# Patient Record
Sex: FEMALE | HISPANIC OR LATINO | URBAN - METROPOLITAN AREA
[De-identification: names, ages, dates, MRNs, and addresses within clinical notes are randomized per-mention and may not be internally consistent; named-entity substitution may affect disease eponyms.]

---

## 2021-01-01 ENCOUNTER — INPATIENT (INPATIENT)
Facility: HOSPITAL | Age: 66
LOS: 0 days | DRG: 435 | End: 2021-11-19
Attending: INTERNAL MEDICINE | Admitting: INTERNAL MEDICINE
Payer: MEDICARE

## 2021-01-01 VITALS
OXYGEN SATURATION: 96 % | HEIGHT: 62 IN | RESPIRATION RATE: 20 BRPM | SYSTOLIC BLOOD PRESSURE: 108 MMHG | WEIGHT: 149.91 LBS | TEMPERATURE: 99 F | DIASTOLIC BLOOD PRESSURE: 70 MMHG | HEART RATE: 87 BPM

## 2021-01-01 VITALS — OXYGEN SATURATION: 91 %

## 2021-01-01 DIAGNOSIS — K86.89 OTHER SPECIFIED DISEASES OF PANCREAS: ICD-10-CM

## 2021-01-01 DIAGNOSIS — I21.4 NON-ST ELEVATION (NSTEMI) MYOCARDIAL INFARCTION: ICD-10-CM

## 2021-01-01 DIAGNOSIS — E11.9 TYPE 2 DIABETES MELLITUS WITHOUT COMPLICATIONS: ICD-10-CM

## 2021-01-01 DIAGNOSIS — R10.9 UNSPECIFIED ABDOMINAL PAIN: ICD-10-CM

## 2021-01-01 DIAGNOSIS — N17.9 ACUTE KIDNEY FAILURE, UNSPECIFIED: ICD-10-CM

## 2021-01-01 DIAGNOSIS — I21.A1 MYOCARDIAL INFARCTION TYPE 2: ICD-10-CM

## 2021-01-01 DIAGNOSIS — R11.10 VOMITING, UNSPECIFIED: ICD-10-CM

## 2021-01-01 DIAGNOSIS — Z86.018 PERSONAL HISTORY OF OTHER BENIGN NEOPLASM: Chronic | ICD-10-CM

## 2021-01-01 DIAGNOSIS — I10 ESSENTIAL (PRIMARY) HYPERTENSION: ICD-10-CM

## 2021-01-01 DIAGNOSIS — K81.9 CHOLECYSTITIS, UNSPECIFIED: ICD-10-CM

## 2021-01-01 DIAGNOSIS — Z29.9 ENCOUNTER FOR PROPHYLACTIC MEASURES, UNSPECIFIED: ICD-10-CM

## 2021-01-01 DIAGNOSIS — R74.01 ELEVATION OF LEVELS OF LIVER TRANSAMINASE LEVELS: ICD-10-CM

## 2021-01-01 LAB
A1C WITH ESTIMATED AVERAGE GLUCOSE RESULT: 9.3 % — HIGH (ref 4–5.6)
AFP-TM SERPL-MCNC: <1.8 NG/ML — SIGNIFICANT CHANGE UP
ALBUMIN SERPL ELPH-MCNC: 1.8 G/DL — LOW (ref 3.5–5)
ALBUMIN SERPL ELPH-MCNC: 2.3 G/DL — LOW (ref 3.5–5)
ALBUMIN SERPL ELPH-MCNC: 2.6 G/DL — LOW (ref 3.5–5)
ALBUMIN SERPL ELPH-MCNC: 3.1 G/DL — LOW (ref 3.5–5)
ALP SERPL-CCNC: 68 U/L — SIGNIFICANT CHANGE UP (ref 40–120)
ALP SERPL-CCNC: 79 U/L — SIGNIFICANT CHANGE UP (ref 40–120)
ALP SERPL-CCNC: 93 U/L — SIGNIFICANT CHANGE UP (ref 40–120)
ALP SERPL-CCNC: 99 U/L — SIGNIFICANT CHANGE UP (ref 40–120)
ALT FLD-CCNC: 2527 U/L DA — HIGH (ref 10–60)
ALT FLD-CCNC: 2968 U/L DA — HIGH (ref 10–60)
ALT FLD-CCNC: 3409 U/L DA — HIGH (ref 10–60)
ALT FLD-CCNC: 98 U/L DA — HIGH (ref 10–60)
ANION GAP SERPL CALC-SCNC: 12 MMOL/L — SIGNIFICANT CHANGE UP (ref 5–17)
ANION GAP SERPL CALC-SCNC: 18 MMOL/L — HIGH (ref 5–17)
ANION GAP SERPL CALC-SCNC: 18 MMOL/L — HIGH (ref 5–17)
ANION GAP SERPL CALC-SCNC: 19 MMOL/L — HIGH (ref 5–17)
ANISOCYTOSIS BLD QL: SLIGHT — SIGNIFICANT CHANGE UP
APPEARANCE UR: ABNORMAL
APTT BLD: 36.2 SEC — HIGH (ref 27.5–35.5)
AST SERPL-CCNC: 163 U/L — HIGH (ref 10–40)
AST SERPL-CCNC: 4685 U/L — HIGH (ref 10–40)
AST SERPL-CCNC: 6158 U/L — HIGH (ref 10–40)
AST SERPL-CCNC: 8606 U/L — HIGH (ref 10–40)
BASE EXCESS BLDV CALC-SCNC: -11.2 MMOL/L — SIGNIFICANT CHANGE UP
BASE EXCESS BLDV CALC-SCNC: -17.1 MMOL/L — SIGNIFICANT CHANGE UP
BASOPHILS # BLD AUTO: 0.04 K/UL — SIGNIFICANT CHANGE UP (ref 0–0.2)
BASOPHILS # BLD AUTO: 0.05 K/UL — SIGNIFICANT CHANGE UP (ref 0–0.2)
BASOPHILS # BLD AUTO: 0.05 K/UL — SIGNIFICANT CHANGE UP (ref 0–0.2)
BASOPHILS NFR BLD AUTO: 0.3 % — SIGNIFICANT CHANGE UP (ref 0–2)
BASOPHILS NFR BLD AUTO: 0.3 % — SIGNIFICANT CHANGE UP (ref 0–2)
BASOPHILS NFR BLD AUTO: 0.6 % — SIGNIFICANT CHANGE UP (ref 0–2)
BILIRUB DIRECT SERPL-MCNC: 0.5 MG/DL — HIGH (ref 0–0.2)
BILIRUB INDIRECT FLD-MCNC: 0.6 MG/DL — SIGNIFICANT CHANGE UP (ref 0.2–1)
BILIRUB SERPL-MCNC: 0.9 MG/DL — SIGNIFICANT CHANGE UP (ref 0.2–1.2)
BILIRUB SERPL-MCNC: 1 MG/DL — SIGNIFICANT CHANGE UP (ref 0.2–1.2)
BILIRUB SERPL-MCNC: 1.1 MG/DL — SIGNIFICANT CHANGE UP (ref 0.2–1.2)
BILIRUB SERPL-MCNC: 1.2 MG/DL — SIGNIFICANT CHANGE UP (ref 0.2–1.2)
BILIRUB UR-MCNC: ABNORMAL
BLOOD GAS COMMENTS, VENOUS: SIGNIFICANT CHANGE UP
BUN SERPL-MCNC: 14 MG/DL — SIGNIFICANT CHANGE UP (ref 7–18)
BUN SERPL-MCNC: 21 MG/DL — HIGH (ref 7–18)
BUN SERPL-MCNC: 23 MG/DL — HIGH (ref 7–18)
BUN SERPL-MCNC: 24 MG/DL — HIGH (ref 7–18)
BURR CELLS BLD QL SMEAR: PRESENT — SIGNIFICANT CHANGE UP
CALCIUM SERPL-MCNC: 7.9 MG/DL — LOW (ref 8.4–10.5)
CALCIUM SERPL-MCNC: 8.3 MG/DL — LOW (ref 8.4–10.5)
CALCIUM SERPL-MCNC: 8.9 MG/DL — SIGNIFICANT CHANGE UP (ref 8.4–10.5)
CALCIUM SERPL-MCNC: 9.1 MG/DL — SIGNIFICANT CHANGE UP (ref 8.4–10.5)
CANCER AG125 SERPL-ACNC: 382 U/ML — HIGH
CANCER AG19-9 SERPL-ACNC: 1427 U/ML — HIGH
CHLORIDE SERPL-SCNC: 103 MMOL/L — SIGNIFICANT CHANGE UP (ref 96–108)
CHLORIDE SERPL-SCNC: 106 MMOL/L — SIGNIFICANT CHANGE UP (ref 96–108)
CHLORIDE SERPL-SCNC: 107 MMOL/L — SIGNIFICANT CHANGE UP (ref 96–108)
CHLORIDE SERPL-SCNC: 110 MMOL/L — HIGH (ref 96–108)
CHOLEST SERPL-MCNC: 95 MG/DL — SIGNIFICANT CHANGE UP
CO2 SERPL-SCNC: 15 MMOL/L — LOW (ref 22–31)
CO2 SERPL-SCNC: 15 MMOL/L — LOW (ref 22–31)
CO2 SERPL-SCNC: 17 MMOL/L — LOW (ref 22–31)
CO2 SERPL-SCNC: 22 MMOL/L — SIGNIFICANT CHANGE UP (ref 22–31)
COLOR SPEC: YELLOW — SIGNIFICANT CHANGE UP
COVID-19 NUCLEOCAPSID GAM AB INTERP: NEGATIVE — SIGNIFICANT CHANGE UP
COVID-19 NUCLEOCAPSID TOTAL GAM ANTIBODY RESULT: 0.08 INDEX — SIGNIFICANT CHANGE UP
COVID-19 SPIKE DOMAIN AB INTERP: POSITIVE
COVID-19 SPIKE DOMAIN ANTIBODY RESULT: >250 U/ML — HIGH
CREAT SERPL-MCNC: 1.64 MG/DL — HIGH (ref 0.5–1.3)
CREAT SERPL-MCNC: 2.54 MG/DL — HIGH (ref 0.5–1.3)
CREAT SERPL-MCNC: 2.81 MG/DL — HIGH (ref 0.5–1.3)
CREAT SERPL-MCNC: 2.99 MG/DL — HIGH (ref 0.5–1.3)
D DIMER BLD IA.RAPID-MCNC: HIGH NG/ML DDU
DIFF PNL FLD: ABNORMAL
EOSINOPHIL # BLD AUTO: 0.01 K/UL — SIGNIFICANT CHANGE UP (ref 0–0.5)
EOSINOPHIL # BLD AUTO: 0.03 K/UL — SIGNIFICANT CHANGE UP (ref 0–0.5)
EOSINOPHIL # BLD AUTO: 0.03 K/UL — SIGNIFICANT CHANGE UP (ref 0–0.5)
EOSINOPHIL NFR BLD AUTO: 0.1 % — SIGNIFICANT CHANGE UP (ref 0–6)
EOSINOPHIL NFR BLD AUTO: 0.2 % — SIGNIFICANT CHANGE UP (ref 0–6)
EOSINOPHIL NFR BLD AUTO: 0.3 % — SIGNIFICANT CHANGE UP (ref 0–6)
ESTIMATED AVERAGE GLUCOSE: 220 MG/DL — HIGH (ref 68–114)
GIANT PLATELETS BLD QL SMEAR: PRESENT — SIGNIFICANT CHANGE UP
GLUCOSE BLDC GLUCOMTR-MCNC: 183 MG/DL — HIGH (ref 70–99)
GLUCOSE BLDC GLUCOMTR-MCNC: 212 MG/DL — HIGH (ref 70–99)
GLUCOSE BLDC GLUCOMTR-MCNC: 216 MG/DL — HIGH (ref 70–99)
GLUCOSE BLDC GLUCOMTR-MCNC: 284 MG/DL — HIGH (ref 70–99)
GLUCOSE BLDC GLUCOMTR-MCNC: 297 MG/DL — HIGH (ref 70–99)
GLUCOSE SERPL-MCNC: 187 MG/DL — HIGH (ref 70–99)
GLUCOSE SERPL-MCNC: 240 MG/DL — HIGH (ref 70–99)
GLUCOSE SERPL-MCNC: 301 MG/DL — HIGH (ref 70–99)
GLUCOSE SERPL-MCNC: 309 MG/DL — HIGH (ref 70–99)
GLUCOSE UR QL: 50 MG/DL
HCO3 BLDV-SCNC: 12 MMOL/L — LOW (ref 22–29)
HCO3 BLDV-SCNC: 16 MMOL/L — LOW (ref 22–29)
HCT VFR BLD CALC: 33 % — LOW (ref 34.5–45)
HCT VFR BLD CALC: 39.3 % — SIGNIFICANT CHANGE UP (ref 34.5–45)
HCT VFR BLD CALC: 39.9 % — SIGNIFICANT CHANGE UP (ref 34.5–45)
HCT VFR BLD CALC: 40.1 % — SIGNIFICANT CHANGE UP (ref 34.5–45)
HCV AB S/CO SERPL IA: 0.15 S/CO — SIGNIFICANT CHANGE UP (ref 0–0.99)
HCV AB SERPL-IMP: SIGNIFICANT CHANGE UP
HDLC SERPL-MCNC: 38 MG/DL — LOW
HGB BLD-MCNC: 10.2 G/DL — LOW (ref 11.5–15.5)
HGB BLD-MCNC: 12.3 G/DL — SIGNIFICANT CHANGE UP (ref 11.5–15.5)
HGB BLD-MCNC: 12.7 G/DL — SIGNIFICANT CHANGE UP (ref 11.5–15.5)
HGB BLD-MCNC: 12.8 G/DL — SIGNIFICANT CHANGE UP (ref 11.5–15.5)
HOROWITZ INDEX BLDV+IHG-RTO: 21 — SIGNIFICANT CHANGE UP
IMM GRANULOCYTES NFR BLD AUTO: 0.8 % — SIGNIFICANT CHANGE UP (ref 0–1.5)
IMM GRANULOCYTES NFR BLD AUTO: 1.5 % — SIGNIFICANT CHANGE UP (ref 0–1.5)
IMM GRANULOCYTES NFR BLD AUTO: 1.6 % — HIGH (ref 0–1.5)
INR BLD: 3.13 RATIO — HIGH (ref 0.88–1.16)
KETONES UR-MCNC: ABNORMAL
LACTATE SERPL-SCNC: 11.1 MMOL/L — CRITICAL HIGH (ref 0.7–2)
LACTATE SERPL-SCNC: 11.8 MMOL/L — CRITICAL HIGH (ref 0.7–2)
LACTATE SERPL-SCNC: 9.1 MMOL/L — CRITICAL HIGH (ref 0.7–2)
LEUKOCYTE ESTERASE UR-ACNC: ABNORMAL
LG PLATELETS BLD QL AUTO: SLIGHT — SIGNIFICANT CHANGE UP
LIDOCAIN IGE QN: 38 U/L — LOW (ref 73–393)
LIPID PNL WITH DIRECT LDL SERPL: 31 MG/DL — SIGNIFICANT CHANGE UP
LYMPHOCYTES # BLD AUTO: 0.85 K/UL — LOW (ref 1–3.3)
LYMPHOCYTES # BLD AUTO: 1.46 K/UL — SIGNIFICANT CHANGE UP (ref 1–3.3)
LYMPHOCYTES # BLD AUTO: 10.3 % — LOW (ref 13–44)
LYMPHOCYTES # BLD AUTO: 11 % — LOW (ref 13–44)
LYMPHOCYTES # BLD AUTO: 15.5 % — SIGNIFICANT CHANGE UP (ref 13–44)
LYMPHOCYTES # BLD AUTO: 2.92 K/UL — SIGNIFICANT CHANGE UP (ref 1–3.3)
LYMPHOCYTES # BLD AUTO: 9.8 % — LOW (ref 13–44)
MAGNESIUM SERPL-MCNC: 1.6 MG/DL — SIGNIFICANT CHANGE UP (ref 1.6–2.6)
MAGNESIUM SERPL-MCNC: 1.9 MG/DL — SIGNIFICANT CHANGE UP (ref 1.6–2.6)
MAGNESIUM SERPL-MCNC: 2.1 MG/DL — SIGNIFICANT CHANGE UP (ref 1.6–2.6)
MCHC RBC-ENTMCNC: 27.8 PG — SIGNIFICANT CHANGE UP (ref 27–34)
MCHC RBC-ENTMCNC: 28.2 PG — SIGNIFICANT CHANGE UP (ref 27–34)
MCHC RBC-ENTMCNC: 28.4 PG — SIGNIFICANT CHANGE UP (ref 27–34)
MCHC RBC-ENTMCNC: 29.1 PG — SIGNIFICANT CHANGE UP (ref 27–34)
MCHC RBC-ENTMCNC: 30.9 GM/DL — LOW (ref 32–36)
MCHC RBC-ENTMCNC: 31.3 GM/DL — LOW (ref 32–36)
MCHC RBC-ENTMCNC: 31.8 GM/DL — LOW (ref 32–36)
MCHC RBC-ENTMCNC: 31.9 GM/DL — LOW (ref 32–36)
MCV RBC AUTO: 87.2 FL — SIGNIFICANT CHANGE UP (ref 80–100)
MCV RBC AUTO: 90.8 FL — SIGNIFICANT CHANGE UP (ref 80–100)
MCV RBC AUTO: 91.2 FL — SIGNIFICANT CHANGE UP (ref 80–100)
MCV RBC AUTO: 91.3 FL — SIGNIFICANT CHANGE UP (ref 80–100)
MICROCYTES BLD QL: SLIGHT — SIGNIFICANT CHANGE UP
MONOCYTES # BLD AUTO: 0.56 K/UL — SIGNIFICANT CHANGE UP (ref 0–0.9)
MONOCYTES # BLD AUTO: 1.63 K/UL — HIGH (ref 0–0.9)
MONOCYTES # BLD AUTO: 1.7 K/UL — HIGH (ref 0–0.9)
MONOCYTES NFR BLD AUTO: 11.5 % — SIGNIFICANT CHANGE UP (ref 2–14)
MONOCYTES NFR BLD AUTO: 6 % — SIGNIFICANT CHANGE UP (ref 2–14)
MONOCYTES NFR BLD AUTO: 6.4 % — SIGNIFICANT CHANGE UP (ref 2–14)
MONOCYTES NFR BLD AUTO: 9 % — SIGNIFICANT CHANGE UP (ref 2–14)
NEUTROPHILS # BLD AUTO: 10.84 K/UL — HIGH (ref 1.8–7.4)
NEUTROPHILS # BLD AUTO: 13.79 K/UL — HIGH (ref 1.8–7.4)
NEUTROPHILS # BLD AUTO: 7.15 K/UL — SIGNIFICANT CHANGE UP (ref 1.8–7.4)
NEUTROPHILS NFR BLD AUTO: 73.4 % — SIGNIFICANT CHANGE UP (ref 43–77)
NEUTROPHILS NFR BLD AUTO: 76.3 % — SIGNIFICANT CHANGE UP (ref 43–77)
NEUTROPHILS NFR BLD AUTO: 80 % — HIGH (ref 43–77)
NEUTROPHILS NFR BLD AUTO: 82.1 % — HIGH (ref 43–77)
NEUTS BAND # BLD: 3 % — SIGNIFICANT CHANGE UP (ref 0–8)
NITRITE UR-MCNC: POSITIVE
NON HDL CHOLESTEROL: 57 MG/DL — SIGNIFICANT CHANGE UP
NRBC # BLD: 0 /100 WBCS — SIGNIFICANT CHANGE UP (ref 0–0)
OSMOLALITY UR: 324 MOS/KG — SIGNIFICANT CHANGE UP (ref 50–1200)
OVALOCYTES BLD QL SMEAR: SLIGHT — SIGNIFICANT CHANGE UP
PCO2 BLDV: 40 MMHG — SIGNIFICANT CHANGE UP (ref 39–42)
PCO2 BLDV: 42 MMHG — SIGNIFICANT CHANGE UP (ref 39–42)
PH BLDV: 7.09 — LOW (ref 7.32–7.43)
PH BLDV: 7.2 — LOW (ref 7.32–7.43)
PH UR: 5 — SIGNIFICANT CHANGE UP (ref 5–8)
PHOSPHATE SERPL-MCNC: 6.8 MG/DL — HIGH (ref 2.5–4.5)
PHOSPHATE SERPL-MCNC: 7 MG/DL — HIGH (ref 2.5–4.5)
PHOSPHATE SERPL-MCNC: 8.3 MG/DL — HIGH (ref 2.5–4.5)
PLAT MORPH BLD: NORMAL — SIGNIFICANT CHANGE UP
PLATELET # BLD AUTO: 108 K/UL — LOW (ref 150–400)
PLATELET # BLD AUTO: 113 K/UL — LOW (ref 150–400)
PLATELET # BLD AUTO: 134 K/UL — LOW (ref 150–400)
PLATELET # BLD AUTO: 191 K/UL — SIGNIFICANT CHANGE UP (ref 150–400)
PO2 BLDV: 25 MMHG — SIGNIFICANT CHANGE UP
PO2 BLDV: 48 MMHG — SIGNIFICANT CHANGE UP
POLYCHROMASIA BLD QL SMEAR: SLIGHT — SIGNIFICANT CHANGE UP
POTASSIUM SERPL-MCNC: 4.8 MMOL/L — SIGNIFICANT CHANGE UP (ref 3.5–5.3)
POTASSIUM SERPL-MCNC: 5.1 MMOL/L — SIGNIFICANT CHANGE UP (ref 3.5–5.3)
POTASSIUM SERPL-MCNC: 5.9 MMOL/L — HIGH (ref 3.5–5.3)
POTASSIUM SERPL-MCNC: 6 MMOL/L — HIGH (ref 3.5–5.3)
POTASSIUM SERPL-SCNC: 4.8 MMOL/L — SIGNIFICANT CHANGE UP (ref 3.5–5.3)
POTASSIUM SERPL-SCNC: 5.1 MMOL/L — SIGNIFICANT CHANGE UP (ref 3.5–5.3)
POTASSIUM SERPL-SCNC: 5.9 MMOL/L — HIGH (ref 3.5–5.3)
POTASSIUM SERPL-SCNC: 6 MMOL/L — HIGH (ref 3.5–5.3)
PROT SERPL-MCNC: 4.9 G/DL — LOW (ref 6–8.3)
PROT SERPL-MCNC: 5.2 G/DL — LOW (ref 6–8.3)
PROT SERPL-MCNC: 6.2 G/DL — SIGNIFICANT CHANGE UP (ref 6–8.3)
PROT SERPL-MCNC: 7.4 G/DL — SIGNIFICANT CHANGE UP (ref 6–8.3)
PROT UR-MCNC: 100
PROTHROM AB SERPL-ACNC: 35.3 SEC — HIGH (ref 10.6–13.6)
RBC # BLD: 3.62 M/UL — LOW (ref 3.8–5.2)
RBC # BLD: 4.33 M/UL — SIGNIFICANT CHANGE UP (ref 3.8–5.2)
RBC # BLD: 4.37 M/UL — SIGNIFICANT CHANGE UP (ref 3.8–5.2)
RBC # BLD: 4.6 M/UL — SIGNIFICANT CHANGE UP (ref 3.8–5.2)
RBC # FLD: 14.3 % — SIGNIFICANT CHANGE UP (ref 10.3–14.5)
RBC # FLD: 14.6 % — HIGH (ref 10.3–14.5)
RBC # FLD: 14.7 % — HIGH (ref 10.3–14.5)
RBC # FLD: 14.7 % — HIGH (ref 10.3–14.5)
RBC BLD AUTO: ABNORMAL
SAO2 % BLDV: 31.4 % — SIGNIFICANT CHANGE UP
SAO2 % BLDV: 64.7 % — SIGNIFICANT CHANGE UP
SARS-COV-2 IGG+IGM SERPL QL IA: 0.08 INDEX — SIGNIFICANT CHANGE UP
SARS-COV-2 IGG+IGM SERPL QL IA: >250 U/ML — HIGH
SARS-COV-2 IGG+IGM SERPL QL IA: NEGATIVE — SIGNIFICANT CHANGE UP
SARS-COV-2 IGG+IGM SERPL QL IA: POSITIVE
SARS-COV-2 RNA SPEC QL NAA+PROBE: SIGNIFICANT CHANGE UP
SCHISTOCYTES BLD QL AUTO: SLIGHT — SIGNIFICANT CHANGE UP
SODIUM SERPL-SCNC: 137 MMOL/L — SIGNIFICANT CHANGE UP (ref 135–145)
SODIUM SERPL-SCNC: 140 MMOL/L — SIGNIFICANT CHANGE UP (ref 135–145)
SODIUM SERPL-SCNC: 142 MMOL/L — SIGNIFICANT CHANGE UP (ref 135–145)
SODIUM SERPL-SCNC: 143 MMOL/L — SIGNIFICANT CHANGE UP (ref 135–145)
SODIUM UR-SCNC: 7 MMOL/L — SIGNIFICANT CHANGE UP
SP GR SPEC: 1.02 — SIGNIFICANT CHANGE UP (ref 1.01–1.02)
SPHEROCYTES BLD QL SMEAR: SLIGHT — SIGNIFICANT CHANGE UP
TRIGL SERPL-MCNC: 129 MG/DL — SIGNIFICANT CHANGE UP
TROPONIN I, HIGH SENSITIVITY RESULT: 1974.3 NG/L — HIGH
TROPONIN I, HIGH SENSITIVITY RESULT: 2153.1 NG/L — HIGH
TSH SERPL-MCNC: 0.9 UU/ML — SIGNIFICANT CHANGE UP (ref 0.34–4.82)
UROBILINOGEN FLD QL: 1
WBC # BLD: 14.19 K/UL — HIGH (ref 3.8–10.5)
WBC # BLD: 14.69 K/UL — HIGH (ref 3.8–10.5)
WBC # BLD: 18.79 K/UL — HIGH (ref 3.8–10.5)
WBC # BLD: 8.71 K/UL — SIGNIFICANT CHANGE UP (ref 3.8–10.5)
WBC # FLD AUTO: 14.19 K/UL — HIGH (ref 3.8–10.5)
WBC # FLD AUTO: 14.69 K/UL — HIGH (ref 3.8–10.5)
WBC # FLD AUTO: 18.79 K/UL — HIGH (ref 3.8–10.5)
WBC # FLD AUTO: 8.71 K/UL — SIGNIFICANT CHANGE UP (ref 3.8–10.5)

## 2021-01-01 PROCEDURE — 71045 X-RAY EXAM CHEST 1 VIEW: CPT | Mod: 26

## 2021-01-01 PROCEDURE — 99221 1ST HOSP IP/OBS SF/LOW 40: CPT

## 2021-01-01 PROCEDURE — 76705 ECHO EXAM OF ABDOMEN: CPT | Mod: 26,RT

## 2021-01-01 PROCEDURE — 71250 CT THORAX DX C-: CPT | Mod: 26

## 2021-01-01 PROCEDURE — 93010 ELECTROCARDIOGRAM REPORT: CPT

## 2021-01-01 PROCEDURE — G1004: CPT

## 2021-01-01 PROCEDURE — 99285 EMERGENCY DEPT VISIT HI MDM: CPT

## 2021-01-01 PROCEDURE — 74176 CT ABD & PELVIS W/O CONTRAST: CPT | Mod: 26,MG

## 2021-01-01 RX ORDER — MIDAZOLAM HYDROCHLORIDE 1 MG/ML
2 INJECTION, SOLUTION INTRAMUSCULAR; INTRAVENOUS ONCE
Refills: 0 | Status: DISCONTINUED | OUTPATIENT
Start: 2021-01-01 | End: 2021-01-01

## 2021-01-01 RX ORDER — INSULIN HUMAN 100 [IU]/ML
5 INJECTION, SOLUTION SUBCUTANEOUS ONCE
Refills: 0 | Status: DISCONTINUED | OUTPATIENT
Start: 2021-01-01 | End: 2021-01-01

## 2021-01-01 RX ORDER — SODIUM POLYSTYRENE SULFONATE 4.1 MEQ/G
30 POWDER, FOR SUSPENSION ORAL ONCE
Refills: 0 | Status: COMPLETED | OUTPATIENT
Start: 2021-01-01 | End: 2021-01-01

## 2021-01-01 RX ORDER — APREPITANT 80 MG/1
40 CAPSULE ORAL ONCE
Refills: 0 | Status: DISCONTINUED | OUTPATIENT
Start: 2021-01-01 | End: 2021-01-01

## 2021-01-01 RX ORDER — MIDODRINE HYDROCHLORIDE 2.5 MG/1
5 TABLET ORAL ONCE
Refills: 0 | Status: COMPLETED | OUTPATIENT
Start: 2021-01-01 | End: 2021-01-01

## 2021-01-01 RX ORDER — PIPERACILLIN AND TAZOBACTAM 4; .5 G/20ML; G/20ML
3.38 INJECTION, POWDER, LYOPHILIZED, FOR SOLUTION INTRAVENOUS ONCE
Refills: 0 | Status: DISCONTINUED | OUTPATIENT
Start: 2021-01-01 | End: 2021-01-01

## 2021-01-01 RX ORDER — SODIUM CHLORIDE 9 MG/ML
500 INJECTION INTRAMUSCULAR; INTRAVENOUS; SUBCUTANEOUS ONCE
Refills: 0 | Status: COMPLETED | OUTPATIENT
Start: 2021-01-01 | End: 2021-01-01

## 2021-01-01 RX ORDER — MORPHINE SULFATE 50 MG/1
4 CAPSULE, EXTENDED RELEASE ORAL ONCE
Refills: 0 | Status: DISCONTINUED | OUTPATIENT
Start: 2021-01-01 | End: 2021-01-01

## 2021-01-01 RX ORDER — HEPARIN SODIUM 5000 [USP'U]/ML
5500 INJECTION INTRAVENOUS; SUBCUTANEOUS ONCE
Refills: 0 | Status: DISCONTINUED | OUTPATIENT
Start: 2021-01-01 | End: 2021-01-01

## 2021-01-01 RX ORDER — FAMOTIDINE 10 MG/ML
20 INJECTION INTRAVENOUS ONCE
Refills: 0 | Status: COMPLETED | OUTPATIENT
Start: 2021-01-01 | End: 2021-01-01

## 2021-01-01 RX ORDER — CHLORHEXIDINE GLUCONATE 213 G/1000ML
1 SOLUTION TOPICAL
Refills: 0 | Status: DISCONTINUED | OUTPATIENT
Start: 2021-01-01 | End: 2021-01-01

## 2021-01-01 RX ORDER — HEPARIN SODIUM 5000 [USP'U]/ML
INJECTION INTRAVENOUS; SUBCUTANEOUS
Qty: 25000 | Refills: 0 | Status: DISCONTINUED | OUTPATIENT
Start: 2021-01-01 | End: 2021-01-01

## 2021-01-01 RX ORDER — ACETAMINOPHEN 500 MG
1000 TABLET ORAL ONCE
Refills: 0 | Status: COMPLETED | OUTPATIENT
Start: 2021-01-01 | End: 2021-01-01

## 2021-01-01 RX ORDER — SODIUM CHLORIDE 9 MG/ML
1000 INJECTION, SOLUTION INTRAVENOUS ONCE
Refills: 0 | Status: COMPLETED | OUTPATIENT
Start: 2021-01-01 | End: 2021-01-01

## 2021-01-01 RX ORDER — HEPARIN SODIUM 5000 [USP'U]/ML
2500 INJECTION INTRAVENOUS; SUBCUTANEOUS EVERY 6 HOURS
Refills: 0 | Status: DISCONTINUED | OUTPATIENT
Start: 2021-01-01 | End: 2021-01-01

## 2021-01-01 RX ORDER — SODIUM CHLORIDE 9 MG/ML
1000 INJECTION INTRAMUSCULAR; INTRAVENOUS; SUBCUTANEOUS ONCE
Refills: 0 | Status: COMPLETED | OUTPATIENT
Start: 2021-01-01 | End: 2021-01-01

## 2021-01-01 RX ORDER — HYDROMORPHONE HYDROCHLORIDE 2 MG/ML
1 INJECTION INTRAMUSCULAR; INTRAVENOUS; SUBCUTANEOUS EVERY 4 HOURS
Refills: 0 | Status: DISCONTINUED | OUTPATIENT
Start: 2021-01-01 | End: 2021-01-01

## 2021-01-01 RX ORDER — TRAMADOL HYDROCHLORIDE 50 MG/1
25 TABLET ORAL EVERY 6 HOURS
Refills: 0 | Status: DISCONTINUED | OUTPATIENT
Start: 2021-01-01 | End: 2021-01-01

## 2021-01-01 RX ORDER — PIPERACILLIN AND TAZOBACTAM 4; .5 G/20ML; G/20ML
3.38 INJECTION, POWDER, LYOPHILIZED, FOR SOLUTION INTRAVENOUS EVERY 8 HOURS
Refills: 0 | Status: DISCONTINUED | OUTPATIENT
Start: 2021-01-01 | End: 2021-01-01

## 2021-01-01 RX ORDER — ONDANSETRON 8 MG/1
4 TABLET, FILM COATED ORAL ONCE
Refills: 0 | Status: COMPLETED | OUTPATIENT
Start: 2021-01-01 | End: 2021-01-01

## 2021-01-01 RX ORDER — ALBUMIN HUMAN 25 %
50 VIAL (ML) INTRAVENOUS EVERY 6 HOURS
Refills: 0 | Status: DISCONTINUED | OUTPATIENT
Start: 2021-01-01 | End: 2021-01-01

## 2021-01-01 RX ORDER — INSULIN HUMAN 100 [IU]/ML
5 INJECTION, SOLUTION SUBCUTANEOUS ONCE
Refills: 0 | Status: COMPLETED | OUTPATIENT
Start: 2021-01-01 | End: 2021-01-01

## 2021-01-01 RX ORDER — HEPARIN SODIUM 5000 [USP'U]/ML
5500 INJECTION INTRAVENOUS; SUBCUTANEOUS EVERY 6 HOURS
Refills: 0 | Status: DISCONTINUED | OUTPATIENT
Start: 2021-01-01 | End: 2021-01-01

## 2021-01-01 RX ORDER — HEPARIN SODIUM 5000 [USP'U]/ML
5000 INJECTION INTRAVENOUS; SUBCUTANEOUS EVERY 8 HOURS
Refills: 0 | Status: DISCONTINUED | OUTPATIENT
Start: 2021-01-01 | End: 2021-01-01

## 2021-01-01 RX ORDER — ATORVASTATIN CALCIUM 80 MG/1
20 TABLET, FILM COATED ORAL AT BEDTIME
Refills: 0 | Status: DISCONTINUED | OUTPATIENT
Start: 2021-01-01 | End: 2021-01-01

## 2021-01-01 RX ORDER — ASPIRIN/CALCIUM CARB/MAGNESIUM 324 MG
81 TABLET ORAL DAILY
Refills: 0 | Status: DISCONTINUED | OUTPATIENT
Start: 2021-01-01 | End: 2021-01-01

## 2021-01-01 RX ORDER — CALCIUM GLUCONATE 100 MG/ML
1 VIAL (ML) INTRAVENOUS ONCE
Refills: 0 | Status: COMPLETED | OUTPATIENT
Start: 2021-01-01 | End: 2021-01-01

## 2021-01-01 RX ORDER — SODIUM CHLORIDE 9 MG/ML
500 INJECTION, SOLUTION INTRAVENOUS ONCE
Refills: 0 | Status: COMPLETED | OUTPATIENT
Start: 2021-01-01 | End: 2021-01-01

## 2021-01-01 RX ORDER — DEXTROSE 50 % IN WATER 50 %
25 SYRINGE (ML) INTRAVENOUS ONCE
Refills: 0 | Status: COMPLETED | OUTPATIENT
Start: 2021-01-01 | End: 2021-01-01

## 2021-01-01 RX ORDER — SODIUM BICARBONATE 1 MEQ/ML
0.22 SYRINGE (ML) INTRAVENOUS
Qty: 150 | Refills: 0 | Status: DISCONTINUED | OUTPATIENT
Start: 2021-01-01 | End: 2021-01-01

## 2021-01-01 RX ORDER — SODIUM BICARBONATE 1 MEQ/ML
50 SYRINGE (ML) INTRAVENOUS ONCE
Refills: 0 | Status: COMPLETED | OUTPATIENT
Start: 2021-01-01 | End: 2021-01-01

## 2021-01-01 RX ORDER — INSULIN LISPRO 100/ML
VIAL (ML) SUBCUTANEOUS EVERY 6 HOURS
Refills: 0 | Status: DISCONTINUED | OUTPATIENT
Start: 2021-01-01 | End: 2021-01-01

## 2021-01-01 RX ORDER — ASPIRIN/CALCIUM CARB/MAGNESIUM 324 MG
325 TABLET ORAL ONCE
Refills: 0 | Status: COMPLETED | OUTPATIENT
Start: 2021-01-01 | End: 2021-01-01

## 2021-01-01 RX ORDER — SODIUM CHLORIDE 9 MG/ML
10 INJECTION INTRAMUSCULAR; INTRAVENOUS; SUBCUTANEOUS
Refills: 0 | Status: DISCONTINUED | OUTPATIENT
Start: 2021-01-01 | End: 2021-01-01

## 2021-01-01 RX ORDER — SODIUM CHLORIDE 9 MG/ML
1000 INJECTION, SOLUTION INTRAVENOUS
Refills: 0 | Status: DISCONTINUED | OUTPATIENT
Start: 2021-01-01 | End: 2021-01-01

## 2021-01-01 RX ORDER — PROCHLORPERAZINE MALEATE 5 MG
10 TABLET ORAL ONCE
Refills: 0 | Status: DISCONTINUED | OUTPATIENT
Start: 2021-01-01 | End: 2021-01-01

## 2021-01-01 RX ORDER — DEXTROSE 50 % IN WATER 50 %
50 SYRINGE (ML) INTRAVENOUS ONCE
Refills: 0 | Status: DISCONTINUED | OUTPATIENT
Start: 2021-01-01 | End: 2021-01-01

## 2021-01-01 RX ORDER — SODIUM ZIRCONIUM CYCLOSILICATE 10 G/10G
10 POWDER, FOR SUSPENSION ORAL ONCE
Refills: 0 | Status: COMPLETED | OUTPATIENT
Start: 2021-01-01 | End: 2021-01-01

## 2021-01-01 RX ORDER — ACETYLCYSTEINE 200 MG/ML
10 VIAL (ML) MISCELLANEOUS ONCE
Refills: 0 | Status: COMPLETED | OUTPATIENT
Start: 2021-01-01 | End: 2021-01-01

## 2021-01-01 RX ORDER — ACETYLCYSTEINE 200 MG/ML
7 VIAL (ML) MISCELLANEOUS ONCE
Refills: 0 | Status: DISCONTINUED | OUTPATIENT
Start: 2021-01-01 | End: 2021-01-01

## 2021-01-01 RX ORDER — APREPITANT 80 MG/1
40 CAPSULE ORAL ONCE
Refills: 0 | Status: COMPLETED | OUTPATIENT
Start: 2021-01-01 | End: 2021-01-01

## 2021-01-01 RX ORDER — ACETYLCYSTEINE 200 MG/ML
3.4 VIAL (ML) MISCELLANEOUS ONCE
Refills: 0 | Status: COMPLETED | OUTPATIENT
Start: 2021-01-01 | End: 2021-01-01

## 2021-01-01 RX ORDER — NOREPINEPHRINE BITARTRATE/D5W 8 MG/250ML
0.05 PLASTIC BAG, INJECTION (ML) INTRAVENOUS
Qty: 16 | Refills: 0 | Status: DISCONTINUED | OUTPATIENT
Start: 2021-01-01 | End: 2021-01-01

## 2021-01-01 RX ORDER — DEXTROSE 50 % IN WATER 50 %
50 SYRINGE (ML) INTRAVENOUS ONCE
Refills: 0 | Status: COMPLETED | OUTPATIENT
Start: 2021-01-01 | End: 2021-01-01

## 2021-01-01 RX ORDER — PIPERACILLIN AND TAZOBACTAM 4; .5 G/20ML; G/20ML
3.38 INJECTION, POWDER, LYOPHILIZED, FOR SOLUTION INTRAVENOUS EVERY 12 HOURS
Refills: 0 | Status: DISCONTINUED | OUTPATIENT
Start: 2021-01-01 | End: 2021-01-01

## 2021-01-01 RX ORDER — METOPROLOL TARTRATE 50 MG
50 TABLET ORAL DAILY
Refills: 0 | Status: DISCONTINUED | OUTPATIENT
Start: 2021-01-01 | End: 2021-01-01

## 2021-01-01 RX ORDER — MORPHINE SULFATE 50 MG/1
2 CAPSULE, EXTENDED RELEASE ORAL EVERY 4 HOURS
Refills: 0 | Status: DISCONTINUED | OUTPATIENT
Start: 2021-01-01 | End: 2021-01-01

## 2021-01-01 RX ADMIN — MORPHINE SULFATE 4 MILLIGRAM(S): 50 CAPSULE, EXTENDED RELEASE ORAL at 16:30

## 2021-01-01 RX ADMIN — MIDAZOLAM HYDROCHLORIDE 2 MILLIGRAM(S): 1 INJECTION, SOLUTION INTRAMUSCULAR; INTRAVENOUS at 08:58

## 2021-01-01 RX ADMIN — Medication 1: at 11:42

## 2021-01-01 RX ADMIN — SODIUM CHLORIDE 1000 MILLILITER(S): 9 INJECTION INTRAMUSCULAR; INTRAVENOUS; SUBCUTANEOUS at 17:00

## 2021-01-01 RX ADMIN — Medication 100 GRAM(S): at 23:27

## 2021-01-01 RX ADMIN — SODIUM CHLORIDE 500 MILLILITER(S): 9 INJECTION INTRAMUSCULAR; INTRAVENOUS; SUBCUTANEOUS at 03:15

## 2021-01-01 RX ADMIN — SODIUM CHLORIDE 75 MILLILITER(S): 9 INJECTION, SOLUTION INTRAVENOUS at 11:43

## 2021-01-01 RX ADMIN — Medication 100 GRAM(S): at 07:13

## 2021-01-01 RX ADMIN — HEPARIN SODIUM 5000 UNIT(S): 5000 INJECTION INTRAVENOUS; SUBCUTANEOUS at 07:22

## 2021-01-01 RX ADMIN — APREPITANT 40 MILLIGRAM(S): 80 CAPSULE ORAL at 05:07

## 2021-01-01 RX ADMIN — Medication 50 MILLIEQUIVALENT(S): at 07:55

## 2021-01-01 RX ADMIN — SODIUM CHLORIDE 1000 MILLILITER(S): 9 INJECTION, SOLUTION INTRAVENOUS at 05:05

## 2021-01-01 RX ADMIN — INSULIN HUMAN 5 UNIT(S): 100 INJECTION, SOLUTION SUBCUTANEOUS at 23:15

## 2021-01-01 RX ADMIN — MORPHINE SULFATE 4 MILLIGRAM(S): 50 CAPSULE, EXTENDED RELEASE ORAL at 17:00

## 2021-01-01 RX ADMIN — SODIUM CHLORIDE 3000 MILLILITER(S): 9 INJECTION, SOLUTION INTRAVENOUS at 04:02

## 2021-01-01 RX ADMIN — Medication 25 MILLILITER(S): at 23:15

## 2021-01-01 RX ADMIN — Medication 325 MILLIGRAM(S): at 20:00

## 2021-01-01 RX ADMIN — ONDANSETRON 4 MILLIGRAM(S): 8 TABLET, FILM COATED ORAL at 16:30

## 2021-01-01 RX ADMIN — Medication 129.25 GRAM(S): at 13:35

## 2021-01-01 RX ADMIN — Medication 50 MILLILITER(S): at 11:37

## 2021-01-01 RX ADMIN — FAMOTIDINE 20 MILLIGRAM(S): 10 INJECTION INTRAVENOUS at 16:30

## 2021-01-01 RX ADMIN — SODIUM CHLORIDE 500 MILLILITER(S): 9 INJECTION INTRAMUSCULAR; INTRAVENOUS; SUBCUTANEOUS at 01:29

## 2021-01-01 RX ADMIN — Medication 50 MILLILITER(S): at 06:36

## 2021-01-01 RX ADMIN — ONDANSETRON 4 MILLIGRAM(S): 8 TABLET, FILM COATED ORAL at 20:00

## 2021-01-01 RX ADMIN — Medication 50 MILLIEQUIVALENT(S): at 07:22

## 2021-01-01 RX ADMIN — SODIUM CHLORIDE 1000 MILLILITER(S): 9 INJECTION INTRAMUSCULAR; INTRAVENOUS; SUBCUTANEOUS at 16:30

## 2021-01-01 RX ADMIN — SODIUM POLYSTYRENE SULFONATE 30 GRAM(S): 4.1 POWDER, FOR SUSPENSION ORAL at 23:57

## 2021-01-01 RX ADMIN — Medication 100 MEQ/KG/HR: at 12:18

## 2021-01-01 RX ADMIN — Medication 300 GRAM(S): at 11:50

## 2021-01-01 RX ADMIN — Medication 1000 MILLIGRAM(S): at 08:55

## 2021-01-01 RX ADMIN — SODIUM ZIRCONIUM CYCLOSILICATE 10 GRAM(S): 10 POWDER, FOR SUSPENSION ORAL at 07:26

## 2021-01-01 RX ADMIN — SODIUM CHLORIDE 1000 MILLILITER(S): 9 INJECTION INTRAMUSCULAR; INTRAVENOUS; SUBCUTANEOUS at 10:01

## 2021-01-01 RX ADMIN — Medication 50 MILLILITER(S): at 08:51

## 2021-01-01 RX ADMIN — MIDAZOLAM HYDROCHLORIDE 2 MILLIGRAM(S): 1 INJECTION, SOLUTION INTRAMUSCULAR; INTRAVENOUS at 09:18

## 2021-01-01 RX ADMIN — Medication 400 MILLIGRAM(S): at 04:33

## 2021-01-01 RX ADMIN — CHLORHEXIDINE GLUCONATE 1 APPLICATION(S): 213 SOLUTION TOPICAL at 11:30

## 2021-01-01 RX ADMIN — MIDODRINE HYDROCHLORIDE 5 MILLIGRAM(S): 2.5 TABLET ORAL at 04:33

## 2021-01-01 RX ADMIN — SODIUM CHLORIDE 1000 MILLILITER(S): 9 INJECTION INTRAMUSCULAR; INTRAVENOUS; SUBCUTANEOUS at 07:00

## 2021-11-18 NOTE — ED ADULT TRIAGE NOTE - CADM TRG TX PRIOR TO ARRIVAL
Vaccine Information Statement(s) was given today. This has been reviewed, questions answered, and verbal consent given by Patient for injection(s) and administration of Influenza (Inactivated).    Patient tolerated without incident. See immunization grid for documentation.         none

## 2021-11-18 NOTE — ED PROVIDER NOTE - CLINICAL SUMMARY MEDICAL DECISION MAKING FREE TEXT BOX
Patient with abdominal pain and vomiting. ?New onset of atrial fibrillation. Get labs, rate control, fluids, CT, and reassess.

## 2021-11-18 NOTE — H&P ADULT - PROBLEM SELECTOR PLAN 2
Zofran 4 mg q8hr for now  EKG showed QTc 478   Serial ekgs while on Zofran   Rest of the management as above s/p 2 doses of Zofran 4 mg in ED  EKG showed QTc 478   Serial ekgs if pt gets antiemetic   Rest of the management as above

## 2021-11-18 NOTE — ED ADULT NURSE NOTE - NSIMPLEMENTINTERV_GEN_ALL_ED
Implemented All Fall Risk Interventions:  [place] to call system. Call bell, personal items and telephone within reach. Instruct patient to call for assistance. Room bathroom lighting operational. Non-slip footwear when patient is off stretcher. Physically safe environment: no spills, clutter or unnecessary equipment. Stretcher in lowest position, wheels locked, appropriate side rails in place. Provide visual cue, wrist band, yellow gown, etc. Monitor gait and stability. Monitor for mental status changes and reorient to person, place, and time. Review medications for side effects contributing to fall risk. Reinforce activity limits and safety measures with patient and family.

## 2021-11-18 NOTE — H&P ADULT - PROBLEM SELECTOR PLAN 6
Cr 1.6 BUN 19  Unknown baseline   s/p 1 dose of lit bolus in ED  Will start pt on maintenance fluid   f/u ua and urine lytes

## 2021-11-18 NOTE — ED PROVIDER NOTE - PROGRESS NOTE DETAILS
CT shows There is a mass in the region of the pancreatic head measuring 5.3 x 3.6 cm with distal pancreatic atrophy. Innumerable hypodense hepatic lesions concerning for metastatic disease.  trop positive at 1974.  Pt given aspirin in ED.  Will admit.

## 2021-11-18 NOTE — H&P ADULT - PROBLEM SELECTOR PLAN 5
Mildly elevated LFTs  Could be in the setting of liver mets vs cholecystitis   Will obtain US  GI Consult and Surgery Consult in the morning

## 2021-11-18 NOTE — ED PROVIDER NOTE - CARE PLAN
Principal Discharge DX:	NSTEMI (non-ST elevation myocardial infarction)  Secondary Diagnosis:	Abdominal pain   1

## 2021-11-18 NOTE — H&P ADULT - PROBLEM SELECTOR PLAN 9
IMPROVE VTE score: 3  Will manage with:  Heparin S/C    [ ] Previous VTE                                    3  [ ] Thrombophilia                                  2  [ ] Lower limb paralysis                        2  (unable to hold up >15 seconds)    [ ] Current Cancer (within 6 months)        2   [x] Immobilization > 24 hrs                    1  [ ] ICU/CCU stay > 24 hrs                      1  [x] Age > 60                                         1

## 2021-11-18 NOTE — H&P ADULT - PROBLEM SELECTOR PLAN 3
Pt with afib (paroxysmal ) on first ekg resolved spontaneously with T wave inversion in V1  trops 1974> 2153> f/u tropx 3   Repeat ekg with no afib but ST segment elevation only in V1   Spoke to Dr Lugo could be demand ischemia given her CT scan findings and generalized abdominal pain vs NSTEMI   If trops trend up then will treat as NSTEMI and will start patient on heparin drip

## 2021-11-18 NOTE — H&P ADULT - PROBLEM SELECTOR PLAN 4
CT A/P showed mass in the region of the pancreatic head measuring 5.3 x 3.6 cm with distal pancreatic atrophy. Innumerable hypodense hepatic lesions concerning for metastatic disease. The gallbladder is distended with wall thickening/edema. Trace right pleural effusion with small focus of consolidation in the right lower lobe which may be infectious or neoplastic in etiology  f/u tumor markers  Primary team to consult Surgery in the morning

## 2021-11-18 NOTE — H&P ADULT - NSHPPHYSICALEXAM_GEN_ALL_CORE
Vital Signs Last 24 Hrs  T(C): 36.4 (18 Nov 2021 21:06), Max: 37 (18 Nov 2021 12:54)  T(F): 97.6 (18 Nov 2021 21:06), Max: 98.6 (18 Nov 2021 12:54)  HR: 76 (18 Nov 2021 21:06) (67 - 87)  BP: 102/68 (18 Nov 2021 21:06) (102/68 - 123/79)  BP(mean): --  RR: 18 (18 Nov 2021 21:06) (18 - 20)  SpO2: 96% (18 Nov 2021 21:06) (96% - 97%)    GENERAL: Appears in distress due to abdominal pain   EYES: EOMI, PERRLA,   NECK: Supple, No JVD  CHEST/LUNG: Clear to auscultation b/l  No rales, rhonchi, wheezing   HEART: Regular rate and rhythm; No murmurs, +ve S1 S2   ABDOMEN: Soft, Nontender, Nondistended; Bowel sounds present  NERVOUS SYSTEM:  Alert & Oriented X3, normal sensations and normal strength     EXTREMITIES:   No clubbing, cyanosis, or edema

## 2021-11-18 NOTE — ED PROVIDER NOTE - OBJECTIVE STATEMENT
offered - prefers to use son at bedside  66 y.o female with PMHx of diabetes, htn, and HLD presents with weakness, nausea, vomiting, and abdominal pain. Patient is coming from [facility] [place]. Per son, he was told she has some sort of liver mass. He brought her to a PMD yesterday [place] PMD scheduled outpatient test which patient went to today. Patient was getting blood drawn and suddenly developed weakness, dizziness, and vomiting. In the ED patient is complaining of abdominal pain, as well as nausea and vomiting. Triage HR is 135.

## 2021-11-18 NOTE — H&P ADULT - ASSESSMENT
65 yo F, ambulates independently , PMHx of HTN, DM came in with chief complain of intractable abdominal pain and vomiting. Admitted for further evaluation  67 yo F, ambulates independently , PMHx of HTN, DM came in with chief complain of intractable abdominal pain and vomiting. Admitted for further evaluation     Son doesn't know the medications . He will ask some family member to sent him the picture of his mom's meds. Primary team to reconcile medrec in am  67 yo F, ambulates independently , PMHx of HTN, DM came in with chief complain of intractable abdominal pain and vomiting. Admitted for further evaluation     Son doesn't know the medications . He will ask some family member to sent him the picture of his mom's meds. Primary team to reconcile medrec in am . As per surescripts pt is on metoprolol, atorvastatin, and multiple DM meds. will resumé  some meds basis on surescripts.

## 2021-11-18 NOTE — H&P ADULT - ATTENDING COMMENTS
67 yo F, ambulates independently , PMHx of HTN, DM came in with chief complain of intractable abdominal pain and vomiting. Hx obtained from patient and son at bedside. Pt went to her PMD office this morning for blood tests when she came back home she started having vomiting and abdominal pain. Abdominal pain is generalized, intermittent, 10/10 in intensity but came down 6/10 after pain meds in ED, stabbing in nature. Pt denied any SOB, diarrhea, headaches, visual changes, leg edema.     Of note pt's son mentioned to ED provider that pt is coming from [facility] and he was told that pt has liver mass and he  has been establishing PMD for his mom.     In ED: first ekg with afib and t wave inversion in V1 , trop x1 1974, repeat trop 2153 and repeat EKG with ST segment elevation in lead V1 only   CT A/P showed There is a mass in the region of the pancreatic head measuring 5.3 x 3.6 cm with distal pancreatic atrophy. Innumerable hypodense hepatic lesions concerning for metastatic disease. Further evaluation with pancreatic protocol CT or MRI is recommended.  The gallbladder is distended with wall thickening/edema. Correlate with right upper quadrant ultrasound if there is clinical concern for acute cholecystitis. Trace right pleural effusion with small focus of consolidation in the right lower lobe which may be infectious or neoplastic in etiology. Follow-up dedicated chest CT is recommended. 65 yo F, ambulates independently , PMHx of HTN, DM came in with chief complain of intractable abdominal pain and vomiting. Hx obtained from patient and son at bedside. Pt went to her PMD office this morning for blood tests when she came back home she started having vomiting and abdominal pain. Abdominal pain is generalized, intermittent, 10/10 in intensity but came down 6/10 after pain meds in ED, stabbing in nature. Pt denied any SOB, diarrhea, headaches, visual changes, leg edema.     Of note pt's son mentioned to ED provider that pt is coming from [facility] and he was told that pt has liver mass and he  has been establishing PMD for his mom.     In ED: first ekg with afib and t wave inversion in V1 , trop x1 1974, repeat trop 2153 and repeat EKG with ST segment elevation in lead V1 only   CT A/P showed There is a mass in the region of the pancreatic head measuring 5.3 x 3.6 cm with distal pancreatic atrophy. Innumerable hypodense hepatic lesions concerning for metastatic disease. Further evaluation with pancreatic protocol CT or MRI is recommended.  The gallbladder is distended with wall thickening/edema. Correlate with right upper quadrant ultrasound if there is clinical concern for acute cholecystitis. Trace right pleural effusion with small focus of consolidation in the right lower lobe which may be infectious or neoplastic in etiology. Follow-up dedicated chest CT is recommended.    # ACS R/O - MONITOR ON TELEMETRY, TRENDING TROPONINS, F/U TSH/LIPIDS/HBA1C  - ASA, STATIN; START BB AND ACE-I IF BP TOLERATES  - F/U ECHOCARDIOGRAM  - CARDIOLOGY CONSULT PLACED - DR. THOMAS     # PANCREATIC MASS - F/U CA 19-9,   - GASTROENTEROLOGY CONSULT, HEME/ONC CONSULT  - MAY NEED MRI ABDOMEN VS. MRCP   - NOTED CT A/P, F/U CT CHEST    # TRANSAMINITIS, R/O CHOLECYSTITIS - NOTED CT A/P, TRENDING LFTS  - GASTROENTEROLOGY CONSULT, SURGERY CONSULT    # DALTON - F/U UA, MONITOR CR, AVOID NEPHROTOXIC AGENTS    # HTN  # DM  # CASE D/W SON KARLEE AT BEDSIDE  # GI AND DVT PPX

## 2021-11-18 NOTE — H&P ADULT - PROBLEM SELECTOR PLAN 1
Came with abdominal pain and vomiting   CT A/P showed mass in the region of the pancreatic head measuring 5.3 x 3.6 cm with distal pancreatic atrophy. Innumerable hypodense hepatic lesions concerning for metastatic disease. The gallbladder is distended with wall thickening/edema. Trace right pleural effusion with small focus of consolidation in the right lower lobe which may be infectious or neoplastic in etiology.  s/p 1 dose of 4mg of morphine in ED with some improvement in pain   s/p 2 dose of 4mg of Zofran in ED   DDx: Malignancy vs cholecystitis vs NSTEMI (less likely )  Will sent tumor markers   Will obtain US abdomen   Pt would benefit with MRCP w/wo contrast  Will start on pain management tramadol for mod and morphine for severe pain ,will avoid Tylenol as pt has mild transaminitis   MAR will consult GI Dr Aguilera and Dr Alfaro in am   Primary team to consult Surgery in the morning  Cardio consulted Dr. Lugo

## 2021-11-18 NOTE — H&P ADULT - HISTORY OF PRESENT ILLNESS
65 yo F, ambulates independently , PMHx of HTN, DM came in with chief complain of intractable abdominal pain and vomiting. Hx obtained from patient and son at bedside. Pt went to her PMD office this morning for blood tests when she came back home she started having vomiting and abdominal pain. Abdominal pain is generalized, intermittent, 10/10 in intensity but came down 6/10 after pain meds in ED, stabbing in nature. Pt denied any SOB, diarrhea, headaches, visual changes, leg edema.     Of note pt's son mentioned to ED provider that pt is coming from [facility] and he was told that pt has liver mass and he is in the process of establishing PMD  67 yo F, ambulates independently , PMHx of HTN, DM came in with chief complain of intractable abdominal pain and vomiting. Hx obtained from patient and son at bedside. Pt went to her PMD office this morning for blood tests when she came back home she started having vomiting and abdominal pain. Abdominal pain is generalized, intermittent, 10/10 in intensity but came down 6/10 after pain meds in ED, stabbing in nature. Pt denied any SOB, diarrhea, headaches, visual changes, leg edema.     Of note pt's son mentioned to ED provider that pt is coming from [facility] and he was told that pt has liver mass and he  has been establishing PMD for his mom.     In ED: first ekg with afib and t wave inversion in V1 , trop x1 1974, repeat trop 2153 and repeat EKG with ST segment elevation in lead V1 only   CT A/P showed There is a mass in the region of the pancreatic head measuring 5.3 x 3.6 cm with distal pancreatic atrophy. Innumerable hypodense hepatic lesions concerning for metastatic disease. Further evaluation with pancreatic protocol CT or MRI is recommended.  The gallbladder is distended with wall thickening/edema. Correlate with right upper quadrant ultrasound if there is clinical concern for acute cholecystitis. Trace right pleural effusion with small focus of consolidation in the right lower lobe which may be infectious or neoplastic in etiology. Follow-up dedicated chest CT is recommended.

## 2021-11-19 NOTE — RAPID RESPONSE TEAM SUMMARY - NSSITUATIONBACKGROUNDRRT_GEN_ALL_CORE
Patient is a 66 year old female, ambulates independently, PMH of HTN and DM, who came in with chief complaints of intractable abdominal pain and vomiting. Patient went to her PMD office for blood tests when she came back home she started having vomiting and abdominal pain. Of note, patient's son mentioned to ED provider that pt is coming from the [facility] [place] and he was told that the patient has a liver mass and he has been establishing PMD for his mom.   In ED: First ekg with afib and t wave inversion in V1 , trop x1 1974, repeat trop 2153 and repeat EKG with ST segment elevation in lead V1 only. CT A/P showed there is a mass in the region of the pancreatic head measuring 5.3 x 3.6cm with distal pancreatic atrophy. Innumerable hypodense hepatic lesions concerning for metastatic disease. The gallbladder is distended with wall thickening/edema. Correlate with right upper quadrant ultrasound if there is clinical concern for acute cholecystitis. Trace right pleural effusion with small focus of consolidation in the right lower lobe which may be infectious or neoplastic in etiology.      Patient was transferred from the ED to the floors and was noted to be hypotensive and received total of 3.5L with no response and BP noted to be low with SBP in 60s. Patient awake but not fully alert. Patient noted to have elevated LFTs concerning for shock liver and noted to have worsening renal function and hyperkalemia. Ordered to give another liter of NS and albumin. Will start zosyn and give lokelma along with dextrose and insulin for hyperkalemia. Son made aware and stated that patient is full code and gave consent for central line if needed. Plan for patient to be transferred to ICU.

## 2021-11-19 NOTE — CHART NOTE - NSCHARTNOTEFT_GEN_A_CORE
Patient was found to be in PEA at 13:32, Asystole 14:34. 5 rounds of Epi, 2 calcium gluconate, and 2 sodium bicarb amps were given. Pt's son made her DNR/DNI at the time of Code blue. Patient was pronounced dead at 14:45 pm. Condolences were offered.

## 2021-11-19 NOTE — CONSULT NOTE ADULT - REASON FOR ADMISSION
Intractable vomiting and abdominal pain

## 2021-11-19 NOTE — PROGRESS NOTE ADULT - ASSESSMENT
Patient is a 66y old  Female who presents with a chief complaint of Intractable vomiting and abdominal pain. Transferred to ICU due to likely septic shock.     Assessment:   - Hypotension likely 2/2 septic shock   - Anion gap metabolic acidosis 2/2 lactic acidosis 2/2 shock liver  - Pancreatic mass  - Intractable nausea/vomiting  - DALTON   - Hyperkalemia   - Demand ischemia  - Lactic acidosis   - Transaminitis likely shock liver   - HTN  - DM     Plan:  Neuro:  - patient is AAOx2 currently   - no other acute issues   - distress due to pain, on dilaudid 1 mg Q4 hrs with standing parameters    Cardiovascular:  #Hypotension likely 2/2 septic shock   - s/p 3.5 L, started on pressor support, pheny and levo    #Demand ischemia  - patient noted to have elevated troponins  - now have downtrended  - f/u ECHO   - cardio, Dr. Lugo consulted; likely demand ischemia     Pulmonary:   - no acute issues     Infectious Diseases:  #Concern for septic shock   - patient presented due to nausea, vomiting and abdominal pain   - CT abdomen showing pancreatic mass and concern for mets   - c/w Zosyn Q12, consulted Dr. Joshi, f/u blood cs     Gastrointestinal:  #Vomting  - keep patient NPO for now   - advance diet as tolerated    #Transaminitis  - patient noted to have elevated LFTs that uptrended to 4000s  - likely shock liver   - LFTs uptrending in the setting of pancreatic mass  - f/u hepatic US  - GI, Dr. Aguilera consulted    Renal:  #DALTON  - patient noted to have worsening renal failure  - monitor creatinine   - creatinnig    #Anion gap secondary to Metabolic acidosis 2/2 Lactic acidosis    #Hyperkalemia  - potassium noted to be uptrending  - likely in setting of worsening renal failure  - received hyperkalemia cocktail including lokelma, D50 and insulin and ca gluconate--resolved  - monitor BMP closely     Heme/onc:   #Pancreatic mass   - patient was noted to have pancreatic mass on CT abdomen   - concern for metastasis  - heme/onc, Dr. Alfaro consulted     Endo:   #DM  - patient with history of DM  - continue SSI   - primary team to confirm home meds with son     Skin/ catheter:   - no acute issues     Prophylaxis:   - HSQ for DVT prophylaxis     Goals of Care:   - Discussed GOC with sonJuan  - patient is FULL CODE     Dispo: Transfer to ICU     Patient is a 66y old  Female who presents with a chief complaint of Intractable vomiting and abdominal pain. Transferred to ICU due to likely septic shock.     Assessment:   - Hypotension likely 2/2 septic shock   - Anion gap metabolic acidosis 2/2 lactic acidosis 2/2 shock liver  - Pancreatic mass  - Intractable nausea/vomiting  - DALTON   - Hyperkalemia   - Demand ischemia  - Lactic acidosis   - Transaminitis likely shock liver   - HTN  - DM     Plan:  Neuro:  - patient is AAOx2 currently   - no other acute issues   - distress due to pain, on dilaudid 1 mg Q4 hrs with standing parameters    Cardiovascular:  #Hypotension likely 2/2 septic shock   - s/p 3.5 L, started on pressor support, pheny and levo    #Demand ischemia  - patient noted to have elevated troponins  - now have downtrended  - f/u ECHO   - cardio, Dr. Lugo consulted; likely demand ischemia     Pulmonary:   - no acute issues     Infectious Diseases:  #Concern for septic shock   - patient presented due to nausea, vomiting and abdominal pain   - CT abdomen showing pancreatic mass and concern for mets   - c/w Zosyn Q12, consulted Dr. Joshi, f/u blood cs     Gastrointestinal:  #Vomting  - keep patient NPO for now       #Transaminitis  - patient noted to have elevated LFTs that uptrended to 4000s  - likely shock liver   - LFTs uptrending in the setting of pancreatic mass  - f/u hepatic US  - GI, Dr. Aguilera consulted    Renal:  #DALTON  - patient noted to have worsening renal failure  - monitor creatinine     #Anion gap secondary to Metabolic acidosis 2/2 Lactic acidosis  Bicarb drip started as per Nephro recs    #Hyperkalemia  - potassium noted to be uptrending  - likely in setting of worsening renal failure  - received hyperkalemia cocktail including lokelma, D50 and insulin and ca gluconate-RESOLVED  - monitor BMP closely     Heme/onc:   #Pancreatic mass   - patient was noted to have pancreatic mass on CT abdomen   - concern for metastasis  - heme/onc, Dr. Alfaro following    Endo:   #DM  - patient with history of DM  - continue SSI   - Fingersticks Q3 hrs as pt has worsening liver function    Skin/ catheter:   - no acute issues     Prophylaxis:   - HSQ for DVT prophylaxis     Goals of Care:   - Discussed GOC with sonJuan  - patient is FULL CODE     Dispo: Transfer to ICU

## 2021-11-19 NOTE — CONSULT NOTE ADULT - PROBLEM SELECTOR RECOMMENDATION 3
Ct of the abdomen showed Distended with gallbladder thickening/edema.   -ABX per primary team  -RUQ sono to r/o stone  -MRCP when medically stable  -supportive care

## 2021-11-19 NOTE — CONSULT NOTE ADULT - SUBJECTIVE AND OBJECTIVE BOX
Nephrology consult:  Patient was called for Hyperkalemia/DALTON/Septic shock/respiratory failure. IV bicarbonate drip was initiated and urine studies were ordered at 11:15 AM for hyperkalemia/acidosis after discussion with resident. Patient with poor prognosis with advanced liver mets not a candidate for RRT/HD.  Nephrology consult:  Patient was called for Hyperkalemia/DALTON/Septic shock/respiratory failure. IV bicarbonate drip was initiated and urine studies were ordered at 11:15 AM for hyperkalemia/acidosis after discussion with resident. Patient with poor prognosis with advanced liver mets not a candidate for RRT/HD.     Lobito Mixon MD (Nephrology)  [address], [facility],  [id number],  [id number]  TEl: [phone]  Cell: [phone]    Patient is a 66y old  Female who presents with a chief complaint of Intractable vomiting and abdominal pain (2021 13:54).       HPI:  65 yo F, ambulates independently , PMHx of HTN, DM came in with chief complain of intractable abdominal pain and vomiting. Hx obtained from patient and son at bedside. Pt went to her PMD office this morning for blood tests when she came back home she started having vomiting and abdominal pain. Abdominal pain is generalized, intermittent, 10/10 in intensity but came down 6/10 after pain meds in ED, stabbing in nature. Pt denied any SOB, diarrhea, headaches, visual changes, leg edema.     Of note pt's son mentioned to ED provider that pt is coming from [facility] and he was told that pt has liver mass and he  has been establishing PMD for his mom.     In ED: first ekg with afib and t wave inversion in V1 , trop x1 1974, repeat trop 2153 and repeat EKG with ST segment elevation in lead V1 only   CT A/P showed There is a mass in the region of the pancreatic head measuring 5.3 x 3.6 cm with distal pancreatic atrophy. Innumerable hypodense hepatic lesions concerning for metastatic disease. Further evaluation with pancreatic protocol CT or MRI is recommended.  The gallbladder is distended with wall thickening/edema. Correlate with right upper quadrant ultrasound if there is clinical concern for acute cholecystitis. Trace right pleural effusion with small focus of consolidation in the right lower lobe which may be infectious or neoplastic in etiology. Follow-up dedicated chest CT is recommended. (2021 21:53)      PAST MEDICAL & SURGICAL HISTORY:  Diabetes    HTN (hypertension)    HLD (hyperlipidemia)    History of uterine fibroid          Allergies:  No Known Allergies      Home Medications:   acetylcysteine IVPB 7 Gram(s) IV Intermittent once  albumin human 25% IVPB 50 milliLiter(s) IV Intermittent every 6 hours  chlorhexidine 2% Cloths 1 Application(s) Topical <User Schedule>  chlorhexidine 4% Liquid 1 Application(s) Topical <User Schedule>  dextrose 5% + sodium chloride 0.9%. 1000 milliLiter(s) IV Continuous <Continuous>  heparin   Injectable 5500 Unit(s) IV Push once  heparin   Injectable 5500 Unit(s) IV Push every 6 hours PRN  heparin   Injectable 2500 Unit(s) IV Push every 6 hours PRN  heparin  Infusion.  Unit(s)/Hr IV Continuous <Continuous>  HYDROmorphone  Injectable 1 milliGRAM(s) IV Push every 4 hours  insulin lispro (ADMELOG) corrective regimen sliding scale   SubCutaneous every 6 hours  norepinephrine Infusion 0.05 MICROgram(s)/kG/Min IV Continuous <Continuous>  piperacillin/tazobactam IVPB.. 3.375 Gram(s) IV Intermittent every 12 hours  sodium bicarbonate  Infusion 0.221 mEq/kG/Hr IV Continuous <Continuous>  sodium chloride 0.9% lock flush 10 milliLiter(s) IV Push every 1 hour PRN      Hospital Medications:   MEDICATIONS  (STANDING):  acetylcysteine IVPB 7 Gram(s) IV Intermittent once  albumin human 25% IVPB 50 milliLiter(s) IV Intermittent every 6 hours  chlorhexidine 2% Cloths 1 Application(s) Topical <User Schedule>  chlorhexidine 4% Liquid 1 Application(s) Topical <User Schedule>  dextrose 5% + sodium chloride 0.9%. 1000 milliLiter(s) (75 mL/Hr) IV Continuous <Continuous>  heparin   Injectable 5500 Unit(s) IV Push once  heparin  Infusion.  Unit(s)/Hr (12 mL/Hr) IV Continuous <Continuous>  HYDROmorphone  Injectable 1 milliGRAM(s) IV Push every 4 hours  insulin lispro (ADMELOG) corrective regimen sliding scale   SubCutaneous every 6 hours  norepinephrine Infusion 0.05 MICROgram(s)/kG/Min (3.19 mL/Hr) IV Continuous <Continuous>  piperacillin/tazobactam IVPB.. 3.375 Gram(s) IV Intermittent every 12 hours  sodium bicarbonate  Infusion 0.221 mEq/kG/Hr (100 mL/Hr) IV Continuous <Continuous>      SOCIAL HISTORY:  Denies ETOh, Smoking,     Family History:  FAMILY HISTORY:      ROS:  Limited    VITALS:  T(F): 97 (21 @ 13:00), Max: 98.3 (21 @ 03:41)  HR: 0 (21 @ 14:45)  BP: 129/100 (21 @ 14:32)  RR: 0 (21 @ 14:45)  SpO2: 91% (21 @ 14:45)  Wt(kg): --      CAPILLARY BLOOD GLUCOSE      POCT Blood Glucose.: 284 mg/dL (2021 14:41)  POCT Blood Glucose.: 183 mg/dL (2021 11:24)  POCT Blood Glucose.: 212 mg/dL (2021 05:56)  POCT Blood Glucose.: 216 mg/dL (2021 03:59)  POCT Blood Glucose.: 297 mg/dL (2021 00:02)        PHYSICAL EXAM:  patient seen laying in bed confused and disoriented, lethargic appearing  HEAD: Normocephalic  EYES: PERRLA, conjunctiva and sclera icterus/pallor  ENMT: TIMMY, EOMI, neck supple  NECK: Supple   NERVOUS SYSTEM: Alert & Oriented X1-2  CHEST/LUNG: mild crackles present to auscultation; No wheezing  HEART: Regular rate and rhythm; No murmurs  ABDOMEN: Soft, Nontender, Nondistended; Bowel sounds present  EXTREMITIES: No clubbing, cyanosis, or edema  LYMPH: No lymphadenopathy noted  SKIN: No rashes     LABS:      142  |  106  |  24<H>  ----------------------------<  301<H>  5.9<H>   |  17<L>  |  2.99<H>    Ca    7.9<L>      2021 14:36  Phos  6.8       Mg     1.9         TPro  5.2<L>  /  Alb  2.3<L>  /  TBili  1.1  /  DBili  0.5<H>  /  AST  8606<H>  /  ALT  3409<H>  /  AlkPhos  79      Creatinine Trend: 2.99 <--, 2.54 <--, 2.81 <--, 2.15 <--, 1.64 <--                        12.3   18.79 )-----------( 113      ( 2021 14:36 )             39.3     Urine Studies:  Urinalysis Basic - ( 2021 15:04 )    Color: Yellow / Appearance: Slightly Turbid / S.025 / pH:   Gluc:  / Ketone: Trace  / Bili: Moderate / Urobili: 1   Blood:  / Protein: 100 / Nitrite: Positive   Leuk Esterase: Moderate / RBC: 2-5 /HPF / WBC 11-25 /HPF   Sq Epi:  / Non Sq Epi: Few /HPF / Bacteria: Trace /HPF      Osmolality, Random Urine: 324 mos/kg ( @ 15:04)  Sodium, Random Urine: 7 mmol/L ( @ 15:04)      RADIOLOGY & ADDITIONAL STUDIES:  rad< from: US Abdomen Upper Quadrant Right (21 @ 16:08) >    EXAM:  US ABDOMEN RT UPR QUADRANT                            PROCEDURE DATE:  2021          INTERPRETATION:  Abdominal pain rule out cholecystitis.    Right upper quadrant ultrasound.    No gallstones noted. There is diffuse edematous gallbladder wall thickening is nonspecific finding may be reactive. Cholecystitis not excluded in the proper clinical setting. Consider HIDA scan as warranted. Borderline to mild common duct dilatation up to 0.7 cm.  Multiple hyperechoic intrahepatic space-occupying lesions are noted, corresponding to finding on recent CT abdomen the most compatible with metastatic disease (patient has a pancreatic head mass). The pancreas is not adequately visualized on this examination.  Right kidney 9.1 cm unremarkable.  No ascites.    IMPRESSION:  Nonspecific gallbladder wall thickening as discussed.  Multiple hepatic lesions most compatible with metastatic disease in this setting    --- End of Report ---            CLIFFORD GUADARRAMA MD; Attending Radiologist  Thisdocument has been electronically signed. 2021  4:19PM    < end of copied text >  < from: CT Chest No Cont (21 @ 03:05) >    EXAM:  CT CHEST                            PROCEDURE DATE:  2021          INTERPRETATION:  PROCEDURE INFORMATION:  Exam: CT Chest Without Contrast  Exam date and time: 2021 3:03 AM  Age: 66 years old  Clinical indication: Pleural effusion    TECHNIQUE:  Imaging protocol: Diagnostic computed tomography of the chest without contrast.  3D rendering (Not supervised by radiologist): MIP and/or 3D reconstructed  images were created by the technologist.    COMPARISON:  CT ABDOMEN AND PELVIS 2021 6:44 PM    FINDINGS:  Lungs/airway: Small bilateral atelectasis. Nonspecific patchy ground-glass opacities within the left mid and lower lung. A few small lung nodules bilaterally with the largest measuring up to 5 mm in the right lower lobe (image 93 series 3); metastasis cannot be excluded. The central airway is patent.  Pleural spaces: Small pleural effusions layering dependently, slightly larger on the right. No evidence for pneumothorax.  Heart: Cardiomegaly. Mild pericardial effusion with thickness of 10 mm.  Vessels: No aortic aneurysm. Aortic and coronary artery calcifications are present. Mild prominence of the main pulmonary artery may represent pulmonary arterial hypertension.  Lymph nodes: Unremarkable. No enlarged lymph nodes.    Liver: Numerous hypodense lesions within the hepatic parenchyma, suggestive of metastasis.  Gallbladder: Nonspecific gallbladder wall thickening.  Pancreas: Large pancreatic head mass. Please see abdominal/pelvic CT yesterday.  Spleen: Normal size spleen.  Intraperitoneal space: Mild ascites.  Bones/joints: Degenerative spondylosis  Soft tissues/neck base: Mild left goiter. 1.5 cm hypodense lesion in the left thyroid; thyroid ultrasound may be pursued for further evaluation.    IMPRESSION:  1. Small bilateral pleural effusions.  2. Mild pericardial effusion.  3. Mild ascites.  4. Pancreatic head mass, suspicious for pancreatic cancer.  5. Multiple hypodense lesions within the liver, suggestive of metastasis. Abdominal MR without and with IV contrast may be pursued for further evaluation.  6. A few small lung nodules bilaterally; metastasis cannot be excluded.  7. Nonspecific patchy ground-glass opacities within the left lung may represent pneumonia. Clinical correlation is recommended.  8. Mild left goiter. 1.5 cm hypodense lesion in the left thyroid; thyroid ultrasound may be pursued for further evaluation.  9. Nonspecific gallbladder wall thickening if clinically indicated, gallbladder ultrasound may be pursued for further evaluation.  10. Mild prominence of the main pulmonary artery may represent pulmonary arterial hypertension.    A preliminary report was provided by [employer].    --- End of Report ---            ASHLY SANDOVAL MD; Attending Radiologist  This document has been electronically signed. 2021  8:54AM    < end of copied text >    EKG findings reviewed.    Imaging Personally Reviewed:  [x] YES  [ ] NO    Consultant(s) and primary physician Notes Reviewed:  [x] YES  [ ] NO    Care Discussed with Primary team/ Consultants/Other Providers [x] YES  [ ] NO

## 2021-11-19 NOTE — CONSULT NOTE ADULT - SUBJECTIVE AND OBJECTIVE BOX
Patient is a 66y old  Female who presents with a chief complaint of Intractable vomiting and abdominal pain (19 Nov 2021 06:34)      HPI:  65 yo F, ambulates independently , PMHx of HTN, DM came in with chief complain of intractable abdominal pain and vomiting. Hx obtained from patient and son at bedside. Pt went to her PMD office this morning for blood tests when she came back home she started having vomiting and abdominal pain. Abdominal pain is generalized, intermittent, 10/10 in intensity but came down 6/10 after pain meds in ED, stabbing in nature. Pt denied any SOB, diarrhea, headaches, visual changes, leg edema.     Of note pt's son mentioned to ED provider that pt is coming from [facility] and he was told that pt has liver mass and he  has been establishing PMD for his mom.     In ED: first ekg with afib and t wave inversion in V1 , trop x1 1974, repeat trop 2153 and repeat EKG with ST segment elevation in lead V1 only   CT A/P showed There is a mass in the region of the pancreatic head measuring 5.3 x 3.6 cm with distal pancreatic atrophy. Innumerable hypodense hepatic lesions concerning for metastatic disease. Further evaluation with pancreatic protocol CT or MRI is recommended.  The gallbladder is distended with wall thickening/edema. Correlate with right upper quadrant ultrasound if there is clinical concern for acute cholecystitis. Trace right pleural effusion with small focus of consolidation in the right lower lobe which may be infectious or neoplastic in etiology. Follow-up dedicated chest CT is recommended. (18 Nov 2021 21:53) she became hypotensive with troponin 2000, ST elevation, elevated liver enzymes.       ROS:  Negative except for:    PAST MEDICAL & SURGICAL HISTORY:  Diabetes    HTN (hypertension)    HLD (hyperlipidemia)    History of uterine fibroid        SOCIAL HISTORY:    FAMILY HISTORY:      MEDICATIONS  (STANDING):  albumin human 25% IVPB 50 milliLiter(s) IV Intermittent every 6 hours  aspirin enteric coated 81 milliGRAM(s) Oral daily  atorvastatin 20 milliGRAM(s) Oral at bedtime  chlorhexidine 2% Cloths 1 Application(s) Topical <User Schedule>  heparin   Injectable 5000 Unit(s) SubCutaneous every 8 hours  insulin lispro (ADMELOG) corrective regimen sliding scale   SubCutaneous every 6 hours  piperacillin/tazobactam IVPB.. 3.375 Gram(s) IV Intermittent every 12 hours    MEDICATIONS  (PRN):      Allergies    No Known Allergies    Intolerances        Vital Signs Last 24 Hrs  T(C): 36.2 (19 Nov 2021 07:05), Max: 37 (18 Nov 2021 12:54)  T(F): 97.1 (19 Nov 2021 07:05), Max: 98.6 (18 Nov 2021 12:54)  HR: 73 (19 Nov 2021 08:00) (67 - 87)  BP: 74/43 (19 Nov 2021 08:00) (62/33 - 123/79)  BP(mean): 48 (19 Nov 2021 08:00) (48 - 48)  RR: 19 (19 Nov 2021 08:00) (16 - 20)  SpO2: 96% (19 Nov 2021 08:00) (96% - 98%)    PHYSICAL EXAM  General: adult in NAD  HEENT: clear oropharynx, anicteric sclera, pink conjunctiva  Neck: supple  CV: normal S1/S2 with no murmur rubs or gallops  Lungs: positive air movement b/l ant lungs,clear to auscultation, no wheezes, no rales  Abdomen: soft non-tender non-distended, no hepatosplenomegaly  Ext: no clubbing cyanosis or edema  Skin: no rashes and no petechiae  Neuro: alert and oriented X 4, no focal deficits      LABS:                          12.7   14.69 )-----------( 134      ( 19 Nov 2021 05:32 )             39.9         Mean Cell Volume : 91.3 fl  Mean Cell Hemoglobin : 29.1 pg  Mean Cell Hemoglobin Concentration : 31.8 gm/dL  Auto Neutrophil # : x  Auto Lymphocyte # : x  Auto Monocyte # : x  Auto Eosinophil # : x  Auto Basophil # : x  Auto Neutrophil % : x  Auto Lymphocyte % : x  Auto Monocyte % : x  Auto Eosinophil % : x  Auto Basophil % : x      Serial CBC's  11-19 @ 05:32  Hct-39.9 / Hgb-12.7 / Plat-134 / RBC-4.37 / WBC-14.69  Serial CBC's  11-18 @ 15:45  Hct-40.1 / Hgb-12.8 / Plat-191 / RBC-4.60 / WBC-8.71      11-19    140  |  107  |  23<H>  ----------------------------<  240<H>  6.0<H>   |  15<L>  |  2.81<H>    Ca    8.9      19 Nov 2021 05:32  Phos  8.3     11-19  Mg     2.1     11-19    TPro  6.2  /  Alb  2.6<L>  /  TBili  1.2  /  DBili  x   /  AST  4685<H>  /  ALT  2527<H>  /  AlkPhos  93  11-19                      BLOOD SMEAR INTERPRETATION:       RADIOLOGY & ADDITIONAL STUDIES:    < from: CT Abdomen and Pelvis No Cont (11.18.21 @ 18:53) >  PROCEDURE:  CT of the Abdomen and Pelvis was performed.  Sagittal and coronal reformats were performed.    FINDINGS:  LOWER CHEST: Trace right pleural effusion as well as bibasilar atelectasis. Small focus of consolidation at the right lung base with groundglass opacities.    LIVER: Multiple indeterminate hypodense lesions throughout the liver. For reference a lesion in the posterior right hepatic lobe measures 8.2 x 7.2 cm.  BILE DUCTS: Normal caliber.  GALLBLADDER: Distended with gallbladder thickening/edema.  SPLEEN: Within normal limits.  PANCREAS: Pancreatic head mass measures by 0.3 x 3.6 cm with distal pancreatic atrophy.  ADRENALS: Within normal limits.  KIDNEYS/URETERS: Within normal limits.    BLADDER: Minimally distended.  REPRODUCTIVE ORGANS: Hysterectomy.    BOWEL: Tiny hiatal hernia No bowel obstruction. Appendix is normal.  PERITONEUM: Trace abdominal and pelvic ascites.  VESSELS: Atherosclerotic changes.  RETROPERITONEUM/LYMPH NODES: Enlarged upper abdominal/periportal nodes, suboptimally visualized without contrast.  ABDOMINAL WALL: Small fat-containing periumbilical hernia.  BONES: Degenerative changes.    IMPRESSION:  There is a mass in the region of the pancreatic head measuring 5.3 x 3.6 cm with distal pancreatic atrophy. Innumerable hypodense hepatic lesions concerning for metastatic disease. Further evaluation with pancreatic protocol CT or MRI is recommended.    The gallbladder isdistended with wall thickening/edema. Correlate with right upper quadrant ultrasound if there is clinical concern for acute cholecystitis.    Trace right pleural effusion with small focus of consolidation in the right lower lobe which may be infectious or neoplastic in etiology. Follow-up dedicated chest CT is recommended.    Additional findings as above.      < end of copied text >

## 2021-11-19 NOTE — CHART NOTE - NSCHARTNOTEFT_GEN_A_CORE
Pt remains with low BP despite 500cc IVF bolus given by endorsing team (resident);  no dizziness, headache or shortness of breath    MEDICATIONS  (STANDING):  aspirin enteric coated 81 milliGRAM(s) Oral daily  atorvastatin 20 milliGRAM(s) Oral at bedtime  heparin   Injectable 5000 Unit(s) SubCutaneous every 8 hours  insulin lispro (ADMELOG) corrective regimen sliding scale   SubCutaneous every 6 hours  lactated ringers Bolus 500 milliLiter(s) IV Bolus once  lactated ringers. 1000 milliLiter(s) (70 mL/Hr) IV Continuous <Continuous>  midodrine. 5 milliGRAM(s) Oral once    MEDICATIONS  (PRN):  morphine  - Injectable 2 milliGRAM(s) IV Push every 4 hours PRN Severe Pain (7 - 10)  traMADol 25 milliGRAM(s) Oral every 6 hours PRN Moderate Pain (4 - 6)      VITALS:  Vital Signs Last 24 Hrs  T(C): 36.8 (19 Nov 2021 03:41), Max: 37 (18 Nov 2021 12:54)  T(F): 98.3 (19 Nov 2021 03:41), Max: 98.6 (18 Nov 2021 12:54)  HR: 69 (19 Nov 2021 03:41) (67 - 87)  BP: 75/46 (19 Nov 2021 03:41) (64/48 - 123/79)  BP(mean): --  RR: 16 (19 Nov 2021 03:41) (16 - 20)  SpO2: 98% (19 Nov 2021 03:41) (96% - 98%)    PHYSICAL EXAM:  BRIEF EXAM  General: weak appearing (not new as per endorsing report)  Neuro: awake, alert  Lungs: no rales, wheezing,, even and unlabored breathing    LABS:                       12.8   8.71  )-----------( 191      ( 18 Nov 2021 15:45 )             40.1     11-18    139  |  108  |  17  ----------------------------<  261<H>  5.4<H>   |  18<L>  |  2.15<H>    Ca    8.7      18 Nov 2021 22:14    TPro  7.4  /  Alb  3.1<L>  /  TBili  1.0  /  DBili  x   /  AST  163<H>  /  ALT  98<H>  /  AlkPhos  99  11-18    LIVER FUNCTIONS - ( 18 Nov 2021 15:45 )  Alb: 3.1 g/dL / Pro: 7.4 g/dL / ALK PHOS: 99 U/L / ALT: 98 U/L DA / AST: 163 U/L / GGT: x           A/P  66 year old Woman with hx of HTN, DM admitted for intractable abdominal pain and vomiting; found to be hypotensive    Asymptomatic Hypotension likely volume depletion superimposed by vomiting  - NS IVF bolus 500cc x 1 hr  - continue to monitor

## 2021-11-19 NOTE — CONSULT NOTE ADULT - ASSESSMENT
65yo Female from [facility] [place] with Hx of HTN, uncontrolled DM (HbA1c 9.3), presented to [facility] ED on 11/18/21 with intractable vomiting and abdominal pain, and found to have a large pancreatic head mass (5.3x3.6 cm) with distal pancreatic atrophy, multiple indeterminate hypodense lesions throughout the liver suggestive of metastasis (reference lesion in posterior R hepatic lobe 8.2x7.2cm), enlarged upper abdominal/ periportal LNs, trace ascites, also distended GB with GB wall thickening, and concern for pulmonary metastases and PNA. She was admitted to medicine on 11/18, but became hypotensive overnight, non responsive for fluids, thus  was transferred to ICU with suspected septic shock, with DALTON, lactic acidosis, Type II MI, shock liver.  Hepatology was consulted b/o acute, massive, hepatocellular liver enzyme elevation, suspected shock liver.     Multiple indeterminate hypodense liver lesions, suspicious for metastasis  - Possibly pancreatic origin given the large pancreatic head mass  - Would need biopsy when more stable, GI on board, planning EUS-FNB when patient more stable, appreciated  - AFP WNL, CA [phone],  382, can also send CEA  - Hem/onc following, appreciated    Acute, massive, hepatocellular liver enzyme elevation, suspected shock liver  - Patient had mild-moderate transaminase elevation on admission, but liver enzymes roxanna suddenly after being persistently hypotensive overnight  - Most likely ischemic liver injury  - C/w supportive measures per ICU, currently on pressor support  - C/w broad spectrum antibiotics per ICU / ID  - Obtain coagulation parameters  - Avoid hepatotoxic medications  - Was started on NAC, agree  - Obtain US abd for bile ducts, GB, but also with Doppler for hepatic and portal veins, higher risk for thrombosis due to suspected malignancy  - Not related to acute rise, but obtain Hep viral serology, especially that from endemic area. Mild transaminase elevation on admission, although could be related to metastatic liver disease  - Further workup depend on course    GB wall thickening - f/u GI recs    Thank you for the consult  Will continue to follow. Hepatology returns Monday. Please, consult GI on call if change in status, questions, concerns.  D/w primary team   67yo Female from [facility] [place] with Hx of HTN, uncontrolled DM (HbA1c 9.3), presented to [facility] ED on 21 with intractable vomiting and abdominal pain, and found to have a large pancreatic head mass (5.3x3.6 cm) with distal pancreatic atrophy, multiple indeterminate hypodense lesions throughout the liver suggestive of metastasis (reference lesion in posterior R hepatic lobe 8.2x7.2cm), enlarged upper abdominal/ periportal LNs, trace ascites, also distended GB with GB wall thickening, and concern for pulmonary metastases and PNA. She was admitted to medicine on , but became hypotensive overnight, non responsive for fluids, thus  was transferred to ICU with suspected septic shock, with DALTON, lactic acidosis, Type II MI, shock liver.  Hepatology was consulted b/o acute, massive, hepatocellular liver enzyme elevation, suspected shock liver.     Multiple indeterminate hypodense liver lesions, suspicious for metastasis  - Possibly pancreatic origin given the large pancreatic head mass  - Would need biopsy when more stable, GI on board, planning EUS-FNB when patient more stable, appreciated  - AFP WNL, CA [phone],  382, can also send CEA  - Hem/onc following, appreciated    Acute, massive, hepatocellular liver enzyme elevation, suspected shock liver  - Patient had mild-moderate transaminase elevation on admission, but liver enzymes roxanna suddenly after being persistently hypotensive overnight  - Most likely ischemic liver injury  - C/w supportive measures per ICU, currently on pressor support  - C/w broad spectrum antibiotics per ICU / ID  - Obtain coagulation parameters  - Avoid hepatotoxic medications  - Was started on NAC, agree  - Obtain US abd for bile ducts, GB, but also with Doppler for hepatic and portal veins, higher risk for thrombosis due to suspected malignancy  - Not related to acute rise, but obtain Hep viral serology, especially that from endemic area. Mild transaminase elevation on admission, although could be related to metastatic liver disease  - Further workup depend on course    GB wall thickening - f/u GI recs    Thank you for the consult  Will continue to follow. Hepatology returns Monday. Please, consult GI on call if change in status, questions, concerns.  D/w primary team      Addendum:  Was informed by ICU team that patient .

## 2021-11-19 NOTE — PROGRESS NOTE ADULT - SUBJECTIVE AND OBJECTIVE BOX
INTERVAL HPI/OVERNIGHT EVENTS: ***    PRESSORS: [ ] YES [ ] NO  WHICH:    Antimicrobial:  piperacillin/tazobactam IVPB.. 3.375 Gram(s) IV Intermittent every 12 hours    Cardiovascular:  norepinephrine Infusion 0.05 MICROgram(s)/kG/Min IV Continuous <Continuous>    Pulmonary:    Hematalogic:  heparin   Injectable 5000 Unit(s) SubCutaneous every 8 hours    Other:  acetylcysteine IVPB 7 Gram(s) IV Intermittent once  albumin human 25% IVPB 50 milliLiter(s) IV Intermittent every 6 hours  chlorhexidine 2% Cloths 1 Application(s) Topical <User Schedule>  chlorhexidine 4% Liquid 1 Application(s) Topical <User Schedule>  dextrose 5% + sodium chloride 0.9%. 1000 milliLiter(s) IV Continuous <Continuous>  HYDROmorphone  Injectable 1 milliGRAM(s) IV Push every 4 hours  insulin lispro (ADMELOG) corrective regimen sliding scale   SubCutaneous every 6 hours  sodium bicarbonate  Infusion 0.221 mEq/kG/Hr IV Continuous <Continuous>  sodium chloride 0.9% lock flush 10 milliLiter(s) IV Push every 1 hour PRN    acetylcysteine IVPB 7 Gram(s) IV Intermittent once  albumin human 25% IVPB 50 milliLiter(s) IV Intermittent every 6 hours  chlorhexidine 2% Cloths 1 Application(s) Topical <User Schedule>  chlorhexidine 4% Liquid 1 Application(s) Topical <User Schedule>  dextrose 5% + sodium chloride 0.9%. 1000 milliLiter(s) IV Continuous <Continuous>  heparin   Injectable 5000 Unit(s) SubCutaneous every 8 hours  HYDROmorphone  Injectable 1 milliGRAM(s) IV Push every 4 hours  insulin lispro (ADMELOG) corrective regimen sliding scale   SubCutaneous every 6 hours  norepinephrine Infusion 0.05 MICROgram(s)/kG/Min IV Continuous <Continuous>  piperacillin/tazobactam IVPB.. 3.375 Gram(s) IV Intermittent every 12 hours  sodium bicarbonate  Infusion 0.221 mEq/kG/Hr IV Continuous <Continuous>  sodium chloride 0.9% lock flush 10 milliLiter(s) IV Push every 1 hour PRN    Drug Dosing Weight  Height (cm): 157.5 (18 Nov 2021 12:54)  Weight (kg): 68 (18 Nov 2021 12:54)  BMI (kg/m2): 27.4 (18 Nov 2021 12:54)  BSA (m2): 1.69 (18 Nov 2021 12:54)    CENTRAL LINE: [ ] YES [ ] NO  LOCATION:   DATE INSERTED:  REMOVE: [ ] YES [ ] NO  EXPLAIN:    GARCIA: [ ] YES [ ] NO    DATE INSERTED:  REMOVE:  [ ] YES [ ] NO  EXPLAIN:    A-LINE:  [ ] YES [ ] NO  LOCATION:   DATE INSERTED:  REMOVE:  [ ] YES [ ] NO  EXPLAIN:    PMH -reviewed admission note, no change since admission  PAST MEDICAL & SURGICAL HISTORY:  Diabetes    HTN (hypertension)    HLD (hyperlipidemia)    History of uterine fibroid        ICU Vital Signs Last 24 Hrs  T(C): 36.1 (19 Nov 2021 13:00), Max: 36.8 (18 Nov 2021 15:25)  T(F): 97 (19 Nov 2021 13:00), Max: 98.3 (19 Nov 2021 03:41)  HR: 74 (19 Nov 2021 13:15) (62 - 77)  BP: 87/55 (19 Nov 2021 13:15) (62/33 - 123/79)  BP(mean): 62 (19 Nov 2021 13:15) (42 - 75)  ABP: --  ABP(mean): --  RR: 32 (19 Nov 2021 13:15) (16 - 32)  SpO2: 93% (19 Nov 2021 13:15) (90% - 100%)                  PHYSICAL EXAM:    GENERAL: NAD, well-groomed, well-developed  HEAD:  Atraumatic, Normocephalic  EYES: EOMI, PERRLA, conjunctiva and sclera clear  ENMT: No tonsillar erythema, exudates, or enlargement; Moist mucous membranes, Good dentition, No lesions  NECK: Supple, normal appearance, No JVD; Normal thyroid; Trachea midline  NERVOUS SYSTEM:  Alert & Oriented X3,  Motor Strength 5/5 B/L upper and lower extremities; DTRs 2+ intact and symmetric  CHEST/LUNG: No chest deformity; Normal percussion bilaterally; No rales, rhonchi, wheezing   HEART: Regular rate and rhythm; No murmurs, rubs, or gallops  ABDOMEN: Soft, Nontender, Nondistended; Bowel sounds present  EXTREMITIES:  2+ Peripheral Pulses, No clubbing, cyanosis, or edema  LYMPH: No lymphadenopathy noted  SKIN: No rashes or lesions;  Good capillary refill      LABS:  CBC Full  -  ( 19 Nov 2021 09:39 )  WBC Count : 14.19 K/uL  RBC Count : 3.62 M/uL  Hemoglobin : 10.2 g/dL  Hematocrit : 33.0 %  Platelet Count - Automated : 108 K/uL  Mean Cell Volume : 91.2 fl  Mean Cell Hemoglobin : 28.2 pg  Mean Cell Hemoglobin Concentration : 30.9 gm/dL  Auto Neutrophil # : 10.84 K/uL  Auto Lymphocyte # : 1.46 K/uL  Auto Monocyte # : 1.63 K/uL  Auto Eosinophil # : 0.01 K/uL  Auto Basophil # : 0.04 K/uL  Auto Neutrophil % : 76.3 %  Auto Lymphocyte % : 10.3 %  Auto Monocyte % : 11.5 %  Auto Eosinophil % : 0.1 %  Auto Basophil % : 0.3 %    11-19    143  |  110<H>  |  21<H>  ----------------------------<  187<H>  4.8   |  15<L>  |  2.54<H>    Ca    8.3<L>      19 Nov 2021 09:39  Phos  7.0     11-19  Mg     1.6     11-19    TPro  4.9<L>  /  Alb  1.8<L>  /  TBili  0.9  /  DBili  x   /  AST  6158<H>  /  ALT  2968<H>  /  AlkPhos  68  11-19            RADIOLOGY & ADDITIONAL STUDIES REVIEWED:  ***    [ ]GOALS OF CARE DISCUSSION WITH PATIENT/FAMILY/PROXY:    CRITICAL CARE TIME SPENT: 35 minutes INTERVAL HPI/OVERNIGHT EVENTS: Pt was transferred to ICU after rapid response for hypotension earlier this morning. Pt started on pressors in patient after central line was  placed. Pt was started on Heparin drip in view of NSTEMI. Pt was found to be code blue at 14:32, found to be in PEA. Then asystole at 14:34 and patient     PRESSORS: [ ] YES [ ] NO  WHICH:    Antimicrobial:  piperacillin/tazobactam IVPB.. 3.375 Gram(s) IV Intermittent every 12 hours    Cardiovascular:  norepinephrine Infusion 0.05 MICROgram(s)/kG/Min IV Continuous <Continuous>    Pulmonary:    Hematalogic:  heparin   Injectable 5000 Unit(s) SubCutaneous every 8 hours    Other:  acetylcysteine IVPB 7 Gram(s) IV Intermittent once  albumin human 25% IVPB 50 milliLiter(s) IV Intermittent every 6 hours  chlorhexidine 2% Cloths 1 Application(s) Topical <User Schedule>  chlorhexidine 4% Liquid 1 Application(s) Topical <User Schedule>  dextrose 5% + sodium chloride 0.9%. 1000 milliLiter(s) IV Continuous <Continuous>  HYDROmorphone  Injectable 1 milliGRAM(s) IV Push every 4 hours  insulin lispro (ADMELOG) corrective regimen sliding scale   SubCutaneous every 6 hours  sodium bicarbonate  Infusion 0.221 mEq/kG/Hr IV Continuous <Continuous>  sodium chloride 0.9% lock flush 10 milliLiter(s) IV Push every 1 hour PRN    acetylcysteine IVPB 7 Gram(s) IV Intermittent once  albumin human 25% IVPB 50 milliLiter(s) IV Intermittent every 6 hours  chlorhexidine 2% Cloths 1 Application(s) Topical <User Schedule>  chlorhexidine 4% Liquid 1 Application(s) Topical <User Schedule>  dextrose 5% + sodium chloride 0.9%. 1000 milliLiter(s) IV Continuous <Continuous>  heparin   Injectable 5000 Unit(s) SubCutaneous every 8 hours  HYDROmorphone  Injectable 1 milliGRAM(s) IV Push every 4 hours  insulin lispro (ADMELOG) corrective regimen sliding scale   SubCutaneous every 6 hours  norepinephrine Infusion 0.05 MICROgram(s)/kG/Min IV Continuous <Continuous>  piperacillin/tazobactam IVPB.. 3.375 Gram(s) IV Intermittent every 12 hours  sodium bicarbonate  Infusion 0.221 mEq/kG/Hr IV Continuous <Continuous>  sodium chloride 0.9% lock flush 10 milliLiter(s) IV Push every 1 hour PRN    Drug Dosing Weight  Height (cm): 157.5 (18 Nov 2021 12:54)  Weight (kg): 68 (18 Nov 2021 12:54)  BMI (kg/m2): 27.4 (18 Nov 2021 12:54)  BSA (m2): 1.69 (18 Nov 2021 12:54)    CENTRAL LINE: [ ] YES [ ] NO  LOCATION:   DATE INSERTED:  REMOVE: [ ] YES [ ] NO  EXPLAIN:    GARCIA: [ ] YES [ ] NO    DATE INSERTED:  REMOVE:  [ ] YES [ ] NO  EXPLAIN:    A-LINE:  [ ] YES [ ] NO  LOCATION:   DATE INSERTED:  REMOVE:  [ ] YES [ ] NO  EXPLAIN:    PMH -reviewed admission note, no change since admission  PAST MEDICAL & SURGICAL HISTORY:  Diabetes    HTN (hypertension)    HLD (hyperlipidemia)    History of uterine fibroid        ICU Vital Signs Last 24 Hrs  T(C): 36.1 (19 Nov 2021 13:00), Max: 36.8 (18 Nov 2021 15:25)  T(F): 97 (19 Nov 2021 13:00), Max: 98.3 (19 Nov 2021 03:41)  HR: 74 (19 Nov 2021 13:15) (62 - 77)  BP: 87/55 (19 Nov 2021 13:15) (62/33 - 123/79)  BP(mean): 62 (19 Nov 2021 13:15) (42 - 75)  ABP: --  ABP(mean): --  RR: 32 (19 Nov 2021 13:15) (16 - 32)  SpO2: 93% (19 Nov 2021 13:15) (90% - 100%)                  PHYSICAL EXAM:    GENERAL: NAD, well-groomed, well-developed  HEAD:  Atraumatic, Normocephalic  EYES: EOMI, PERRLA, conjunctiva and sclera clear  ENMT: No tonsillar erythema, exudates, or enlargement; Moist mucous membranes, Good dentition, No lesions  NECK: Supple, normal appearance, No JVD; Normal thyroid; Trachea midline  NERVOUS SYSTEM:  Alert & Oriented X3,  Motor Strength 5/5 B/L upper and lower extremities; DTRs 2+ intact and symmetric  CHEST/LUNG: No chest deformity; Normal percussion bilaterally; No rales, rhonchi, wheezing   HEART: Regular rate and rhythm; No murmurs, rubs, or gallops  ABDOMEN: Soft, Nontender, Nondistended; Bowel sounds present  EXTREMITIES:  2+ Peripheral Pulses, No clubbing, cyanosis, or edema  LYMPH: No lymphadenopathy noted  SKIN: No rashes or lesions;  Good capillary refill      LABS:  CBC Full  -  ( 19 Nov 2021 09:39 )  WBC Count : 14.19 K/uL  RBC Count : 3.62 M/uL  Hemoglobin : 10.2 g/dL  Hematocrit : 33.0 %  Platelet Count - Automated : 108 K/uL  Mean Cell Volume : 91.2 fl  Mean Cell Hemoglobin : 28.2 pg  Mean Cell Hemoglobin Concentration : 30.9 gm/dL  Auto Neutrophil # : 10.84 K/uL  Auto Lymphocyte # : 1.46 K/uL  Auto Monocyte # : 1.63 K/uL  Auto Eosinophil # : 0.01 K/uL  Auto Basophil # : 0.04 K/uL  Auto Neutrophil % : 76.3 %  Auto Lymphocyte % : 10.3 %  Auto Monocyte % : 11.5 %  Auto Eosinophil % : 0.1 %  Auto Basophil % : 0.3 %    11-19    143  |  110<H>  |  21<H>  ----------------------------<  187<H>  4.8   |  15<L>  |  2.54<H>    Ca    8.3<L>      19 Nov 2021 09:39  Phos  7.0     11-19  Mg     1.6     11-19    TPro  4.9<L>  /  Alb  1.8<L>  /  TBili  0.9  /  DBili  x   /  AST  6158<H>  /  ALT  2968<H>  /  AlkPhos  68  11-19            RADIOLOGY & ADDITIONAL STUDIES REVIEWED:  ***    [ ]GOALS OF CARE DISCUSSION WITH PATIENT/FAMILY/PROXY:    CRITICAL CARE TIME SPENT: 35 minutes INTERVAL HPI/OVERNIGHT EVENTS: Pt was transferred to ICU after rapid response for hypotension earlier this morning. Pt started on pressors in patient after central line was  placed. Pt was started on Heparin drip in view of NSTEMI. Pt was found to be code blue at 14:32, found to be in PEA. Then asystole at 14:34 and CPR was begun. Pt's son chose to stop CPR     PRESSORS: [ ] YES [ ] NO  WHICH:    Antimicrobial:  piperacillin/tazobactam IVPB.. 3.375 Gram(s) IV Intermittent every 12 hours    Cardiovascular:  norepinephrine Infusion 0.05 MICROgram(s)/kG/Min IV Continuous <Continuous>    Pulmonary:    Hematalogic:  heparin   Injectable 5000 Unit(s) SubCutaneous every 8 hours    Other:  acetylcysteine IVPB 7 Gram(s) IV Intermittent once  albumin human 25% IVPB 50 milliLiter(s) IV Intermittent every 6 hours  chlorhexidine 2% Cloths 1 Application(s) Topical <User Schedule>  chlorhexidine 4% Liquid 1 Application(s) Topical <User Schedule>  dextrose 5% + sodium chloride 0.9%. 1000 milliLiter(s) IV Continuous <Continuous>  HYDROmorphone  Injectable 1 milliGRAM(s) IV Push every 4 hours  insulin lispro (ADMELOG) corrective regimen sliding scale   SubCutaneous every 6 hours  sodium bicarbonate  Infusion 0.221 mEq/kG/Hr IV Continuous <Continuous>  sodium chloride 0.9% lock flush 10 milliLiter(s) IV Push every 1 hour PRN    acetylcysteine IVPB 7 Gram(s) IV Intermittent once  albumin human 25% IVPB 50 milliLiter(s) IV Intermittent every 6 hours  chlorhexidine 2% Cloths 1 Application(s) Topical <User Schedule>  chlorhexidine 4% Liquid 1 Application(s) Topical <User Schedule>  dextrose 5% + sodium chloride 0.9%. 1000 milliLiter(s) IV Continuous <Continuous>  heparin   Injectable 5000 Unit(s) SubCutaneous every 8 hours  HYDROmorphone  Injectable 1 milliGRAM(s) IV Push every 4 hours  insulin lispro (ADMELOG) corrective regimen sliding scale   SubCutaneous every 6 hours  norepinephrine Infusion 0.05 MICROgram(s)/kG/Min IV Continuous <Continuous>  piperacillin/tazobactam IVPB.. 3.375 Gram(s) IV Intermittent every 12 hours  sodium bicarbonate  Infusion 0.221 mEq/kG/Hr IV Continuous <Continuous>  sodium chloride 0.9% lock flush 10 milliLiter(s) IV Push every 1 hour PRN    Drug Dosing Weight  Height (cm): 157.5 (18 Nov 2021 12:54)  Weight (kg): 68 (18 Nov 2021 12:54)  BMI (kg/m2): 27.4 (18 Nov 2021 12:54)  BSA (m2): 1.69 (18 Nov 2021 12:54)    CENTRAL LINE: [ ] YES [ ] NO  LOCATION:   DATE INSERTED:  REMOVE: [ ] YES [ ] NO  EXPLAIN:    GARCIA: [ ] YES [ ] NO    DATE INSERTED:  REMOVE:  [ ] YES [ ] NO  EXPLAIN:    A-LINE:  [ ] YES [ ] NO  LOCATION:   DATE INSERTED:  REMOVE:  [ ] YES [ ] NO  EXPLAIN:    PMH -reviewed admission note, no change since admission  PAST MEDICAL & SURGICAL HISTORY:  Diabetes    HTN (hypertension)    HLD (hyperlipidemia)    History of uterine fibroid        ICU Vital Signs Last 24 Hrs  T(C): 36.1 (19 Nov 2021 13:00), Max: 36.8 (18 Nov 2021 15:25)  T(F): 97 (19 Nov 2021 13:00), Max: 98.3 (19 Nov 2021 03:41)  HR: 74 (19 Nov 2021 13:15) (62 - 77)  BP: 87/55 (19 Nov 2021 13:15) (62/33 - 123/79)  BP(mean): 62 (19 Nov 2021 13:15) (42 - 75)  ABP: --  ABP(mean): --  RR: 32 (19 Nov 2021 13:15) (16 - 32)  SpO2: 93% (19 Nov 2021 13:15) (90% - 100%)                  PHYSICAL EXAM:    GENERAL: NAD, well-groomed, well-developed  HEAD:  Atraumatic, Normocephalic  EYES: EOMI, PERRLA, conjunctiva and sclera clear  ENMT: No tonsillar erythema, exudates, or enlargement; Moist mucous membranes, Good dentition, No lesions  NECK: Supple, normal appearance, No JVD; Normal thyroid; Trachea midline  NERVOUS SYSTEM:  Alert & Oriented X3,  Motor Strength 5/5 B/L upper and lower extremities; DTRs 2+ intact and symmetric  CHEST/LUNG: No chest deformity; Normal percussion bilaterally; No rales, rhonchi, wheezing   HEART: Regular rate and rhythm; No murmurs, rubs, or gallops  ABDOMEN: Soft, Nontender, Nondistended; Bowel sounds present  EXTREMITIES:  2+ Peripheral Pulses, No clubbing, cyanosis, or edema  LYMPH: No lymphadenopathy noted  SKIN: No rashes or lesions;  Good capillary refill      LABS:  CBC Full  -  ( 19 Nov 2021 09:39 )  WBC Count : 14.19 K/uL  RBC Count : 3.62 M/uL  Hemoglobin : 10.2 g/dL  Hematocrit : 33.0 %  Platelet Count - Automated : 108 K/uL  Mean Cell Volume : 91.2 fl  Mean Cell Hemoglobin : 28.2 pg  Mean Cell Hemoglobin Concentration : 30.9 gm/dL  Auto Neutrophil # : 10.84 K/uL  Auto Lymphocyte # : 1.46 K/uL  Auto Monocyte # : 1.63 K/uL  Auto Eosinophil # : 0.01 K/uL  Auto Basophil # : 0.04 K/uL  Auto Neutrophil % : 76.3 %  Auto Lymphocyte % : 10.3 %  Auto Monocyte % : 11.5 %  Auto Eosinophil % : 0.1 %  Auto Basophil % : 0.3 %    11-19    143  |  110<H>  |  21<H>  ----------------------------<  187<H>  4.8   |  15<L>  |  2.54<H>    Ca    8.3<L>      19 Nov 2021 09:39  Phos  7.0     11-19  Mg     1.6     11-19    TPro  4.9<L>  /  Alb  1.8<L>  /  TBili  0.9  /  DBili  x   /  AST  6158<H>  /  ALT  2968<H>  /  AlkPhos  68  11-19            RADIOLOGY & ADDITIONAL STUDIES REVIEWED:  ***    [ ]GOALS OF CARE DISCUSSION WITH PATIENT/FAMILY/PROXY:    CRITICAL CARE TIME SPENT: 35 minutes INTERVAL HPI/OVERNIGHT EVENTS: Pt was transferred to ICU after rapid response for hypotension earlier this morning. Pt started on pressors in patient after central line was  placed. Pt was started on Heparin drip in view of NSTEMI. Pt was found to be code blue at 14:32, found to be in PEA. Then asystole at 14:34 and CPR was begun. Pt's son chose to stop CPR, pt pronounced dead at 14:45 pm    PRESSORS: [x ] YES [ ] NO  WHICH: Pheny, levo    Antimicrobial:  piperacillin/tazobactam IVPB.. 3.375 Gram(s) IV Intermittent every 12 hours    Cardiovascular:  norepinephrine Infusion 0.05 MICROgram(s)/kG/Min IV Continuous <Continuous>    Pulmonary:    Hematalogic:  heparin   Injectable 5000 Unit(s) SubCutaneous every 8 hours    Other:  acetylcysteine IVPB 7 Gram(s) IV Intermittent once  albumin human 25% IVPB 50 milliLiter(s) IV Intermittent every 6 hours  chlorhexidine 2% Cloths 1 Application(s) Topical <User Schedule>  chlorhexidine 4% Liquid 1 Application(s) Topical <User Schedule>  dextrose 5% + sodium chloride 0.9%. 1000 milliLiter(s) IV Continuous <Continuous>  HYDROmorphone  Injectable 1 milliGRAM(s) IV Push every 4 hours  insulin lispro (ADMELOG) corrective regimen sliding scale   SubCutaneous every 6 hours  sodium bicarbonate  Infusion 0.221 mEq/kG/Hr IV Continuous <Continuous>  sodium chloride 0.9% lock flush 10 milliLiter(s) IV Push every 1 hour PRN    acetylcysteine IVPB 7 Gram(s) IV Intermittent once  albumin human 25% IVPB 50 milliLiter(s) IV Intermittent every 6 hours  chlorhexidine 2% Cloths 1 Application(s) Topical <User Schedule>  chlorhexidine 4% Liquid 1 Application(s) Topical <User Schedule>  dextrose 5% + sodium chloride 0.9%. 1000 milliLiter(s) IV Continuous <Continuous>  heparin   Injectable 5000 Unit(s) SubCutaneous every 8 hours  HYDROmorphone  Injectable 1 milliGRAM(s) IV Push every 4 hours  insulin lispro (ADMELOG) corrective regimen sliding scale   SubCutaneous every 6 hours  norepinephrine Infusion 0.05 MICROgram(s)/kG/Min IV Continuous <Continuous>  piperacillin/tazobactam IVPB.. 3.375 Gram(s) IV Intermittent every 12 hours  sodium bicarbonate  Infusion 0.221 mEq/kG/Hr IV Continuous <Continuous>  sodium chloride 0.9% lock flush 10 milliLiter(s) IV Push every 1 hour PRN    Drug Dosing Weight  Height (cm): 157.5 (18 Nov 2021 12:54)  Weight (kg): 68 (18 Nov 2021 12:54)  BMI (kg/m2): 27.4 (18 Nov 2021 12:54)  BSA (m2): 1.69 (18 Nov 2021 12:54)    CENTRAL LINE: [x ] YES [ ] NO  LOCATION:   DATE INSERTED:  REMOVE: [ ] YES [ ] NO  EXPLAIN:    GARCIA: [ x] YES [ ] NO    DATE INSERTED:  REMOVE:  [ ] YES [ ] NO  EXPLAIN:    A-LINE:  [ ] YES [x ] NO  LOCATION:   DATE INSERTED:  REMOVE:  [ ] YES [ ] NO  EXPLAIN:    PMH -reviewed admission note, no change since admission  PAST MEDICAL & SURGICAL HISTORY:  Diabetes    HTN (hypertension)    HLD (hyperlipidemia)    History of uterine fibroid        ICU Vital Signs Last 24 Hrs  T(C): 36.1 (19 Nov 2021 13:00), Max: 36.8 (18 Nov 2021 15:25)  T(F): 97 (19 Nov 2021 13:00), Max: 98.3 (19 Nov 2021 03:41)  HR: 74 (19 Nov 2021 13:15) (62 - 77)  BP: 87/55 (19 Nov 2021 13:15) (62/33 - 123/79)  BP(mean): 62 (19 Nov 2021 13:15) (42 - 75)  ABP: --  ABP(mean): --  RR: 32 (19 Nov 2021 13:15) (16 - 32)  SpO2: 93% (19 Nov 2021 13:15) (90% - 100%)                  PHYSICAL EXAM:    GENERAL: NAD, well-groomed, well-developed  HEAD:  Atraumatic, Normocephalic  EYES: EOMI, PERRLA, conjunctiva and sclera clear  ENMT: No tonsillar erythema, exudates, or enlargement; Moist mucous membranes, Good dentition, No lesions  NECK: Supple, normal appearance, No JVD; Normal thyroid; Trachea midline  NERVOUS SYSTEM:  Alert & Oriented X3,  Motor Strength 5/5 B/L upper and lower extremities; DTRs 2+ intact and symmetric  CHEST/LUNG: No chest deformity; Normal percussion bilaterally; No rales, rhonchi, wheezing   HEART: Regular rate and rhythm; No murmurs, rubs, or gallops  ABDOMEN: Soft, Nontender, Nondistended; Bowel sounds present  EXTREMITIES:  2+ Peripheral Pulses, No clubbing, cyanosis, or edema  LYMPH: No lymphadenopathy noted  SKIN: No rashes or lesions;  Good capillary refill      LABS:  CBC Full  -  ( 19 Nov 2021 09:39 )  WBC Count : 14.19 K/uL  RBC Count : 3.62 M/uL  Hemoglobin : 10.2 g/dL  Hematocrit : 33.0 %  Platelet Count - Automated : 108 K/uL  Mean Cell Volume : 91.2 fl  Mean Cell Hemoglobin : 28.2 pg  Mean Cell Hemoglobin Concentration : 30.9 gm/dL  Auto Neutrophil # : 10.84 K/uL  Auto Lymphocyte # : 1.46 K/uL  Auto Monocyte # : 1.63 K/uL  Auto Eosinophil # : 0.01 K/uL  Auto Basophil # : 0.04 K/uL  Auto Neutrophil % : 76.3 %  Auto Lymphocyte % : 10.3 %  Auto Monocyte % : 11.5 %  Auto Eosinophil % : 0.1 %  Auto Basophil % : 0.3 %    11-19    143  |  110<H>  |  21<H>  ----------------------------<  187<H>  4.8   |  15<L>  |  2.54<H>    Ca    8.3<L>      19 Nov 2021 09:39  Phos  7.0     11-19  Mg     1.6     11-19    TPro  4.9<L>  /  Alb  1.8<L>  /  TBili  0.9  /  DBili  x   /  AST  6158<H>  /  ALT  2968<H>  /  AlkPhos  68  11-19            RADIOLOGY & ADDITIONAL STUDIES REVIEWED:  ***    [ ]GOALS OF CARE DISCUSSION WITH PATIENT/FAMILY/PROXY:    CRITICAL CARE TIME SPENT: 35 minutes

## 2021-11-19 NOTE — CONSULT NOTE ADULT - ASSESSMENT
This is a 67 yo F, ambulates independently , PMHx of HTN, DM came in with chief complain of intractable abdominal pain and vomiting. Hx obtained from patient and son (Juan) at bedside. GI consult for abdominal pain. CT of the abdomen showed  LIVER: Multiple indeterminate hypodense lesions throughout the liver. For reference a lesion in the posterior right hepatic lobe measures 8.2 x 7.2 cm. GALLBLADDER: Distended with gallbladder thickening/edema. There is a mass in the region of the pancreatic head measuring 5.3 x 3.6 cm with distal pancreatic atrophy. Noted to have leukocytosis,  transaminitis and lactate of 11. Patient had RRT called for hypotensive. Patient was admitted into the ICU for blood pressure management. S/p 4 units of albumin. Noted to have DALTON likely due to hypotensive   As per son, the patient started having mild LUQ discomfort in february 2021 while living in [facility]. Patient went to A GI doctor in  where an EGD was performed. Also CT of abdomen of the done and pt was diagnosed with liver tumor. As per son, GI doctor offered biopsy of the liver but the son refused and wanted to seek medical care in the [facility]. Son endorses aunt with breast cancer.   In ED: first ekg with afib and t wave inversion in V1 , trop x1 1974, repeat trop 2153 and repeat EKG with ST segment elevation in lead V1 only . Cardiology following.

## 2021-11-19 NOTE — CONSULT NOTE ADULT - CONSULT REASON
"Shock liver"
Abdominal pain with pancreatitic mass and possible liver mets
DALTON/Hyperalemia
Abnormal EKG, NSTEMI
hypotension
pancreatic mass, MI, shock

## 2021-11-19 NOTE — CONSULT NOTE ADULT - SUBJECTIVE AND OBJECTIVE BOX
Patient is a 66y old  Female who presents with a chief complaint of Intractable vomiting and abdominal pain (2021 06:34)    HPI:  65 yo F, ambulates independently , PMHx of HTN, DM came in with chief complain of intractable abdominal pain and vomiting. Hx obtained from patient and son at bedside. Pt went to her PMD office this morning for blood tests when she came back home she started having vomiting and abdominal pain. Abdominal pain is generalized, intermittent, 10/10 in intensity but came down 6/10 after pain meds in ED, stabbing in nature. Pt denied any SOB, diarrhea, headaches, visual changes, leg edema.     Of note pt's son mentioned to ED provider that pt is coming from [facility] and he was told that pt has liver mass and he  has been establishing PMD for his mom.     In ED: first ekg with afib and t wave inversion in V1 , trop x1 , repeat trop 2153 and repeat EKG with ST segment elevation in lead V1 only   CT A/P showed There is a mass in the region of the pancreatic head measuring 5.3 x 3.6 cm with distal pancreatic atrophy. Innumerable hypodense hepatic lesions concerning for metastatic disease. Further evaluation with pancreatic protocol CT or MRI is recommended.  The gallbladder is distended with wall thickening/edema. Correlate with right upper quadrant ultrasound if there is clinical concern for acute cholecystitis. Trace right pleural effusion with small focus of consolidation in the right lower lobe which may be infectious or neoplastic in etiology. Follow-up dedicated chest CT is recommended. (2021 21:53)      Allergies    No Known Allergies    Intolerances        MEDICATIONS  (STANDING):  albumin human 25% IVPB 50 milliLiter(s) IV Intermittent every 6 hours  aspirin enteric coated 81 milliGRAM(s) Oral daily  atorvastatin 20 milliGRAM(s) Oral at bedtime  calcium gluconate IVPB 1 Gram(s) IV Intermittent once  chlorhexidine 2% Cloths 1 Application(s) Topical <User Schedule>  heparin   Injectable 5000 Unit(s) SubCutaneous every 8 hours  insulin lispro (ADMELOG) corrective regimen sliding scale   SubCutaneous every 6 hours  sodium bicarbonate  Injectable 50 milliEquivalent(s) IV Push once  sodium bicarbonate  Injectable 50 milliEquivalent(s) IV Push once  sodium chloride 0.9% Bolus 1000 milliLiter(s) IV Bolus once  sodium zirconium cyclosilicate 10 Gram(s) Oral once    MEDICATIONS  (PRN):      Daily Height in cm: 157.48 (2021 12:54)    Daily Weight in k.4 (2021 03:41)    Drug Dosing Weight  Height (cm): 157.5 (2021 12:54)  Weight (kg): 68 (2021 12:54)  BMI (kg/m2): 27.4 (2021 12:54)  BSA (m2): 1.69 (2021 12:54)    PAST MEDICAL & SURGICAL HISTORY:  Diabetes    HTN (hypertension)    HLD (hyperlipidemia)    History of uterine fibroid        FAMILY HISTORY:      SOCIAL HISTORY:    ADVANCE DIRECTIVES:    REVIEW OF SYSTEMS:    CONSTITUTIONAL: No fever, weight loss, or fatigue  EYES: No eye pain, visual disturbances, or discharge  ENMT:  No difficulty hearing, tinnitus, vertigo; No sinus or throat pain  NECK: No pain or stiffness  BREASTS: No pain, masses, or nipple discharge  RESPIRATORY: No cough, wheezing, chills or hemoptysis; No shortness of breath  CARDIOVASCULAR: No chest pain, palpitations, dizziness, or leg swelling  GASTROINTESTINAL: No abdominal or epigastric pain. No nausea, vomiting, or hematemesis; No diarrhea or constipation. No melena or hematochezia.  GENITOURINARY: No dysuria, frequency, hematuria, or incontinence  NEUROLOGICAL: No headaches, memory loss, loss of strength, numbness, or tremors  SKIN: No itching, burning, rashes, or lesions   LYMPH NODES: No enlarged glands  ENDOCRINE: No heat or cold intolerance; No hair loss  MUSCULOSKELETAL: No joint pain or swelling; No muscle, back, or extremity pain  PSYCHIATRIC: No depression, anxiety, mood swings, or difficulty sleeping  HEME/LYMPH: No easy bruising, or bleeding gums  ALLERGY AND IMMUNOLOGIC: No hives or eczema          ICU Vital Signs Last 24 Hrs  T(C): 36.7 (2021 04:31), Max: 37 (2021 12:54)  T(F): 98 (2021 04:31), Max: 98.6 (2021 12:54)  HR: 75 (2021 04:52) (67 - 87)  BP: 84/55 (2021 04:52) (64/48 - 123/79)  BP(mean): --  ABP: --  ABP(mean): --  RR: 17 (2021 04:31) (16 - 20)  SpO2: 96% (2021 04:31) (96% - 98%)          I&O's Detail      PHYSICAL EXAM:    GENERAL: NAD, well-groomed, well-developed  HEAD:  Atraumatic, Normocephalic  EYES: EOMI, PERRLA, conjunctiva and sclera clear  ENMT: No tonsillar erythema, exudates, or enlargement; Moist mucous membranes, Good dentition, No lesions  NECK: Supple, No JVD, Normal thyroid  NERVOUS SYSTEM:  Alert & Oriented X3, Good concentration; Motor Strength 5/5 B/L upper and lower extremities; DTRs 2+ intact and symmetric  CHEST/LUNG: Clear to percussion bilaterally; No rales, rhonchi, wheezing, or rubs  HEART: Regular rate and rhythm; No murmurs, rubs, or gallops  ABDOMEN: Soft, Nontender, Nondistended; Bowel sounds present  EXTREMITIES:  2+ Peripheral Pulses, No clubbing, cyanosis, or edema  LYMPH: No lymphadenopathy noted  SKIN: No rashes or lesions    LABS:  CBC Full  -  ( 2021 05:32 )  WBC Count : 14.69 K/uL  RBC Count : 4.37 M/uL  Hemoglobin : 12.7 g/dL  Hematocrit : 39.9 %  Platelet Count - Automated : 134 K/uL  Mean Cell Volume : 91.3 fl  Mean Cell Hemoglobin : 29.1 pg  Mean Cell Hemoglobin Concentration : 31.8 gm/dL  Auto Neutrophil # : x  Auto Lymphocyte # : x  Auto Monocyte # : x  Auto Eosinophil # : x  Auto Basophil # : x  Auto Neutrophil % : x  Auto Lymphocyte % : x  Auto Monocyte % : x  Auto Eosinophil % : x  Auto Basophil % : x        140  |  107  |  23<H>  ----------------------------<  240<H>  6.0<H>   |  15<L>  |  2.81<H>    Ca    8.9      2021 05:32  Phos  8.3       Mg     2.1         TPro  6.2  /  Alb  2.6<L>  /  TBili  1.2  /  DBili  x   /  AST  4685<H>  /  ALT  2527<H>  /  AlkPhos  93      CAPILLARY BLOOD GLUCOSE      POCT Blood Glucose.: 212 mg/dL (2021 05:56)                EKG:    ECHO, US:    RADIOLOGY:    CRITICAL CARE TIME SPENT:   Patient is a 66y old  Female who presents with a chief complaint of Intractable vomiting and abdominal pain (2021 06:34)    HPI:  65 yo F, ambulates independently , PMHx of HTN, DM came in with chief complain of intractable abdominal pain and vomiting. Hx obtained from patient and son at bedside. Pt went to her PMD office this morning for blood tests when she came back home she started having vomiting and abdominal pain. Abdominal pain is generalized, intermittent, 10/10 in intensity but came down 6/10 after pain meds in ED, stabbing in nature. Pt denied any SOB, diarrhea, headaches, visual changes, leg edema.     Of note pt's son mentioned to ED provider that pt is coming from [facility] and he was told that pt has liver mass and he  has been establishing PMD for his mom.     In ED: first ekg with afib and t wave inversion in V1 , trop x1 1974, repeat trop 2153 and repeat EKG with ST segment elevation in lead V1 only   CT A/P showed There is a mass in the region of the pancreatic head measuring 5.3 x 3.6 cm with distal pancreatic atrophy. Innumerable hypodense hepatic lesions concerning for metastatic disease. Further evaluation with pancreatic protocol CT or MRI is recommended.  The gallbladder is distended with wall thickening/edema. Correlate with right upper quadrant ultrasound if there is clinical concern for acute cholecystitis. Trace right pleural effusion with small focus of consolidation in the right lower lobe which may be infectious or neoplastic in etiology. Follow-up dedicated chest CT is recommended. (2021 21:53)    Interval history: Patient was transferred from the ED to the floors and was noted to be hypotensive and received total of 3.5L with no response and BP noted to be low with SBP in 60s. Patient awake but not fully alert. Patient noted to have elevated LFTs concerning for shock liver and noted to have worsening renal function and hyperkalemia. Ordered to give another liter of NS and albumin. Will start zosyn and give lokelma along with dextrose and insulin for hyperkalemia. Son made aware and stated that patient is full code and gave consent for central line if needed. Plan for patient to be transferred to ICU.     Allergies  No Known Allergies    MEDICATIONS  (STANDING):  albumin human 25% IVPB 50 milliLiter(s) IV Intermittent every 6 hours  aspirin enteric coated 81 milliGRAM(s) Oral daily  atorvastatin 20 milliGRAM(s) Oral at bedtime  calcium gluconate IVPB 1 Gram(s) IV Intermittent once  chlorhexidine 2% Cloths 1 Application(s) Topical <User Schedule>  heparin   Injectable 5000 Unit(s) SubCutaneous every 8 hours  insulin lispro (ADMELOG) corrective regimen sliding scale   SubCutaneous every 6 hours  sodium bicarbonate  Injectable 50 milliEquivalent(s) IV Push once  sodium bicarbonate  Injectable 50 milliEquivalent(s) IV Push once  sodium chloride 0.9% Bolus 1000 milliLiter(s) IV Bolus once  sodium zirconium cyclosilicate 10 Gram(s) Oral once    Daily Height in cm: 157.48 (2021 12:54)    Daily Weight in k.4 (2021 03:41)    Drug Dosing Weight  Height (cm): 157.5 (2021 12:54)  Weight (kg): 68 (2021 12:54)  BMI (kg/m2): 27.4 (2021 12:54)  BSA (m2): 1.69 (2021 12:54)    PAST MEDICAL & SURGICAL HISTORY:  Diabetes    HTN (hypertension)    HLD (hyperlipidemia)    History of uterine fibroid    ADVANCE DIRECTIVES: Full code     REVIEW OF SYSTEMS:  Unable to obtain complete ROS at this time    ICU Vital Signs Last 24 Hrs  T(C): 36.7 (2021 04:31), Max: 37 (2021 12:54)  T(F): 98 (2021 04:31), Max: 98.6 (2021 12:54)  HR: 75 (2021 04:52) (67 - 87)  BP: 84/55 (2021 04:52) (64/48 - 123/79)  RR: 17 (2021 04:31) (16 - 20)  SpO2: 96% (2021 04:31) (96% - 98%)      PHYSICAL EXAM:  GENERAL: patient seen laying in bed and in mild distress  HEAD: Normocephalic  EYES: PERRLA, conjunctiva and sclera clear  ENMT: Moist mucous membranes, Good dentition, No lesions  NECK: Supple   NERVOUS SYSTEM: Alert & Oriented X2  CHEST/LUNG: mild crackles present to auscultation; No wheezing  HEART: Regular rate and rhythm; No murmurs  ABDOMEN: Soft, Nontender, Nondistended; Bowel sounds present  EXTREMITIES: No clubbing, cyanosis, or edema  LYMPH: No lymphadenopathy noted  SKIN: No rashes     LABS:  CBC Full  -  ( 2021 05:32 )  WBC Count : 14.69 K/uL  RBC Count : 4.37 M/uL  Hemoglobin : 12.7 g/dL  Hematocrit : 39.9 %  Platelet Count - Automated : 134 K/uL  Mean Cell Volume : 91.3 fl  Mean Cell Hemoglobin : 29.1 pg  Mean Cell Hemoglobin Concentration : 31.8 gm/dL          140  |  107  |  23<H>  ----------------------------<  240<H>  6.0<H>   |  15<L>  |  2.81<H>    Ca    8.9      2021 05:32  Phos  8.3       Mg     2.1         TPro  6.2  /  Alb  2.6<L>  /  TBili  1.2  /  DBili  x   /  AST  4685<H>  /  ALT  2527<H>  /  AlkPhos  93  -    CAPILLARY BLOOD GLUCOSE    POCT Blood Glucose.: 212 mg/dL (2021 05:56)      < from: CT Chest No Cont (21 @ 03:05) >  IMPRESSION:  1. Small bilateral pleural effusions layering dependently more pronounced on  the right.  2. Mild pericardial effusion with maximal thickness of 10 mm.  3. Perisplenic ascites.  4. Large pancreatic head and uncinate region masslikely malignancy measuring  up to 6 cm.  5. Multiple geographic hypodensities within the liver parenchyma most likely  metastatic in etiology.  6. Hyperexpanded lungs with air trapping and peribronchial thickening  indicating reactive airway diseasewith probable exacerbation.  7. Patchy ground-glass opacities within the left lung may represent scarring or  less likely infectious process.    < end of copied text >  < from: CT Abdomen and Pelvis No Cont (21 @ 18:53) >  IMPRESSION:  There is a mass in the region of the pancreatic head measuring 5.3 x 3.6 cm with distal pancreatic atrophy. Innumerable hypodense hepatic lesions concerning for metastatic disease. Further evaluation with pancreatic protocol CT or MRI is recommended.    The gallbladder isdistended with wall thickening/edema. Correlate with right upper quadrant ultrasound if there is clinical concern for acute cholecystitis.    Trace right pleural effusion with small focus of consolidation in the right lower lobe which may be infectious or neoplastic in etiology. Follow-up dedicated chest CT is recommended.    Additional findings as above.    < end of copied text >

## 2021-11-19 NOTE — CONSULT NOTE ADULT - PROBLEM SELECTOR RECOMMENDATION 9
There is a mass in the region of the pancreatic head measuring 5.3 x 3.6 cm with distal pancreatic atrophy.   -lipase 38  -check CEA, Ca 19.9.    -Patient will need EUS with FNB in the future. Currently patient is not stable.   -Please call GI when patient is down graded  -oncology Dr. Alfaro following

## 2021-11-19 NOTE — CONSULT NOTE ADULT - SUBJECTIVE AND OBJECTIVE BOX
Chief Complaint:  Patient is a 66y old  Female who presents with a chief complaint of Intractable vomiting and abdominal pain (2021 12:09)    History obtained from the chart.     HPI:  TREMAINE SHERMAN is a 67yo Female from [place] with Hx of HTN, uncontrolled DM (HbA1c 9.3), presented to [facility] ED on 21 with intractable vomiting and abdominal pain, and her son also mentioned "liver mass". CT abd/pelvis 21 showed a pancreatic head mass (5.3x3.6 cm) with distal pancreatic atrophy, multiple indeterminate hypodense lesions throughout the liver suggestive of metastasis (reference lesion in posterior R hepatic lobe 8.2x7.2cm), enlarged upper abdominal/ periportal LNs, trace ascites, distended GB with GB wall thickening, normal bile ducts. CT chest 21 showed few small lung nodules b/l, metastasis cannot be excluded, also mild pericardial effusion, small b/l pleural effusions, non specific patchy ground glass opacities in L lung, may represent pneumonia, also concerns for pulmonary hypertension and goiter with L thyroid nodule (1.5 cm).  She was admitted to medicine on , but became hypotensive overnight with BP as low as 62/32 mmHg, non responsive for fluids, thus RRT was called early am on  and was transferred to ICU.      Hepatology was consulted b/o acute, massive, hepatocellular liver enzyme elevation, suspected shock liver.   Her AST roxanna from 163 to 6158, ALT from 98 to 2968, with normal ALP, normal bilirubin, but low albumin (3.1->1.8). Her labs are also significant for DALTON (Cr 1.64-> 2.54), leukocytosis (WBC 14.19) with neutrophilia, elevated Troponins (peak 2153 ng/l), anemia (Hb 10.2), thrombocytopenia (), lactic acidosis (lactate 11.8, bicarb 15). Notably, EKG showed a fib with TWI, then ST elevation in V1.     PMHX/PSHX:    Diabetes  HTN (hypertension)  HLD (hyperlipidemia)  History of uterine fibroid      Allergies:  No Known Allergies      Home Medications: reviewed  Hospital Medications:  acetylcysteine IVPB 7 Gram(s) IV Intermittent once  albumin human 25% IVPB 50 milliLiter(s) IV Intermittent every 6 hours  chlorhexidine 2% Cloths 1 Application(s) Topical <User Schedule>  chlorhexidine 4% Liquid 1 Application(s) Topical <User Schedule>  dextrose 5% + sodium chloride 0.9%. 1000 milliLiter(s) IV Continuous <Continuous>  heparin   Injectable 5000 Unit(s) SubCutaneous every 8 hours  HYDROmorphone  Injectable 1 milliGRAM(s) IV Push every 4 hours  insulin lispro (ADMELOG) corrective regimen sliding scale   SubCutaneous every 6 hours  norepinephrine Infusion 0.05 MICROgram(s)/kG/Min IV Continuous <Continuous>  piperacillin/tazobactam IVPB.. 3.375 Gram(s) IV Intermittent every 12 hours  sodium bicarbonate  Infusion 0.221 mEq/kG/Hr IV Continuous <Continuous>  sodium chloride 0.9% lock flush 10 milliLiter(s) IV Push every 1 hour PRN      ROS:   Unable to obtain due to patient condition    PHYSICAL EXAM:   Vital Signs:  Vital Signs Last 24 Hrs  T(C): 36.1 (2021 13:00), Max: 36.8 (2021 15:25)  T(F): 97 (2021 13:00), Max: 98.3 (2021 03:41)  HR: 74 (2021 13:15) (62 - 77)  BP: 87/55 (2021 13:15) (62/33 - 123/79)  BP(mean): 62 (2021 13:15) (42 - 75)  RR: 32 (2021 13:15) (16 - 32)  SpO2: 93% (2021 13:15) (90% - 100%)  Daily     Daily Weight in k.5 (2021 08:00)    GENERAL: no acute distress  NEURO: alert, no asterixis  HEENT: anicteric sclera, no conjunctival pallor appreciated  CHEST: no respiratory distress, no accessory muscle use  CARDIAC: regular rate, rhythm  ABDOMEN: soft, non-tender, non-distended, no rebound or guarding  EXTREMITIES: warm, well perfused, no edema  SKIN: no lesions noted    LABS: reviewed                        10.2   14.19 )-----------( 108      ( 2021 09:39 )             33.0     11-19    143  |  110<H>  |  21<H>  ----------------------------<  187<H>  4.8   |  15<L>  |  2.54<H>    Ca    8.3<L>      2021 09:39  Phos  7.0       Mg     1.6         TPro  4.9<L>  /  Alb  1.8<L>  /  TBili  0.9  /  DBili  x   /  AST  6158<H>  /  ALT  2968<H>  /  AlkPhos  68      LIVER FUNCTIONS - ( 2021 09:39 )  Alb: 1.8 g/dL / Pro: 4.9 g/dL / ALK PHOS: 68 U/L / ALT: 2968 U/L DA / AST: 6158 U/L / GGT: x               Diagnostic Studies: see sunrise for full report         Chief Complaint:  Patient is a 66y old  Female who presents with a chief complaint of Intractable vomiting and abdominal pain (2021 12:09)    History obtained from the chart.     HPI:  TREMAINE SHERMAN is a 67yo Female from [place] with Hx of HTN, uncontrolled DM (HbA1c 9.3), presented to [facility] ED on 21 with intractable vomiting and abdominal pain, and her son also mentioned "liver mass". CT abd/pelvis 21 showed a pancreatic head mass (5.3x3.6 cm) with distal pancreatic atrophy, multiple indeterminate hypodense lesions throughout the liver suggestive of metastasis (reference lesion in posterior R hepatic lobe 8.2x7.2cm), enlarged upper abdominal/ periportal LNs, trace ascites, distended GB with GB wall thickening, normal bile ducts. CT chest 21 showed few small lung nodules b/l, metastasis cannot be excluded, also mild pericardial effusion, small b/l pleural effusions, non specific patchy ground glass opacities in L lung, may represent pneumonia, also concerns for pulmonary hypertension and goiter with L thyroid nodule (1.5 cm).  She was admitted to medicine on , but became hypotensive overnight with BP as low as 62/32 mmHg, non responsive for fluids, thus RRT was called early am on  and was transferred to ICU.      Hepatology was consulted b/o acute, massive, hepatocellular liver enzyme elevation, suspected shock liver.   Her AST roxanna from 163 to 6158, ALT from 98 to 2968, with normal ALP, normal bilirubin, but low albumin (3.1->1.8). Her labs are also significant for DALTON (Cr 1.64-> 2.54), leukocytosis (WBC 14.19) with neutrophilia, elevated Troponins (peak 2153 ng/l), anemia (Hb 10.2), thrombocytopenia (), lactic acidosis (lactate 11.8, bicarb 15). Notably, EKG showed a fib with TWI, then ST elevation in V1.     PMHX/PSHX:    Diabetes  HTN (hypertension)  HLD (hyperlipidemia)  History of uterine fibroid      Allergies:  No Known Allergies      Home Medications: reviewed  Hospital Medications:  acetylcysteine IVPB 7 Gram(s) IV Intermittent once  albumin human 25% IVPB 50 milliLiter(s) IV Intermittent every 6 hours  chlorhexidine 2% Cloths 1 Application(s) Topical <User Schedule>  chlorhexidine 4% Liquid 1 Application(s) Topical <User Schedule>  dextrose 5% + sodium chloride 0.9%. 1000 milliLiter(s) IV Continuous <Continuous>  heparin   Injectable 5000 Unit(s) SubCutaneous every 8 hours  HYDROmorphone  Injectable 1 milliGRAM(s) IV Push every 4 hours  insulin lispro (ADMELOG) corrective regimen sliding scale   SubCutaneous every 6 hours  norepinephrine Infusion 0.05 MICROgram(s)/kG/Min IV Continuous <Continuous>  piperacillin/tazobactam IVPB.. 3.375 Gram(s) IV Intermittent every 12 hours  sodium bicarbonate  Infusion 0.221 mEq/kG/Hr IV Continuous <Continuous>  sodium chloride 0.9% lock flush 10 milliLiter(s) IV Push every 1 hour PRN      ROS:   Unable to obtain due to patient condition    PHYSICAL EXAM:   Vital Signs:  Vital Signs Last 24 Hrs  T(C): 36.1 (2021 13:00), Max: 36.8 (2021 15:25)  T(F): 97 (2021 13:00), Max: 98.3 (2021 03:41)  HR: 74 (2021 13:15) (62 - 77)  BP: 87/55 (2021 13:15) (62/33 - 123/79)  BP(mean): 62 (2021 13:15) (42 - 75)  RR: 32 (2021 13:15) (16 - 32)  SpO2: 93% (2021 13:15) (90% - 100%)  Daily     Daily Weight in k.5 (2021 08:00)    LABS: reviewed                        10.2   14.19 )-----------( 108      ( 2021 09:39 )             33.0     11-19    143  |  110<H>  |  21<H>  ----------------------------<  187<H>  4.8   |  15<L>  |  2.54<H>    Ca    8.3<L>      2021 09:39  Phos  7.0     -  Mg     1.6     11-19    TPro  4.9<L>  /  Alb  1.8<L>  /  TBili  0.9  /  DBili  x   /  AST  6158<H>  /  ALT  2968<H>  /  AlkPhos  68  11-19    LIVER FUNCTIONS - ( 2021 09:39 )  Alb: 1.8 g/dL / Pro: 4.9 g/dL / ALK PHOS: 68 U/L / ALT: 2968 U/L DA / AST: 6158 U/L / GGT: x               Diagnostic Studies: see sunrise for full report

## 2021-11-19 NOTE — DISCHARGE NOTE FOR THE EXPIRED PATIENT - HOSPITAL COURSE
66 year old female, ambulates independently, PMH of HTN and DM, who came in with chief complaints of intractable abdominal pain and vomiting. Patient was noted to be hypotensive with up trending troponin, rapid response was called and Pt was transferred to ICU after rapid response for hypotension earlier this morning. Pt started on pressors in patient after central line was  placed. Pt was started on Heparin drip in view of NSTEMI. Pt was found to be code blue at 14:32, found to be in PEA. Then asystole at 14:34 and CPR was begun. Pt's son chose to stop CPR, pt pronounced dead at 14:45 pm   66 year old female, ambulates independently, PMH of HTN and DM, who came in with chief complaints of intractable abdominal pain and vomiting. Patient was noted to be hypotensive with up trending troponin, rapid response was called and Pt was transferred to ICU after rapid response for hypotension earlier this morning. Pt started on pressors in patient after central line was  placed. Pt was started on Heparin drip in view of NSTEMI. Pt was found to be code blue at 14:32, found to be in PEA. Then asystole at 14:34 and CPR was begun. Pt's son chose to stop CPR, pt pronounced dead at 14:45 pm      for a full account of hospital course please refer to actual medical records for this is a brief summery

## 2021-11-19 NOTE — CONSULT NOTE ADULT - ASSESSMENT
66 year old lady presented with abdominal pain, nausea.  CT showed large pancreatic mass and liver mets.  She went into hypotension, elevated lactate, troponin and liver enzymes.  she is more stabilized now.  she is  still nauseous, but more comfortable.    1. pancreatic mass with liver mets  check CEA, Ca 19.9.  most likley pancreatic ca  will need biopsy after stabilization    2. AMI, Afib, and shock liver  she is more stabilized today    3. ARF, due to hypotension    4. ?cholecystitis  medical treatment for now

## 2021-11-19 NOTE — CONSULT NOTE ADULT - ASSESSMENT
Assessment:    Plan:  Neuro:    Cardiovascular:    Pulmonary:     Infectious Diseases:    Gastrointestinal:    Renal:    Heme/onc:   #Pancreatic mass   - patient was noted to have     Endo:   #DM  - patient with history of DM  - continue SSI   - primary team to confirm home meds with son     Skin/ catheter:   - no acute issues     Prophylaxis:   - HSQ for DVT prophylaxis     Goals of Care:   - Discussed GOC with sonJuan  - patient is FULL CODE     Dispo: Transfer to ICU Patient is a 66y old  Female who presents with a chief complaint of Intractable vomiting and abdominal pain. Transferred to ICU due to likely septic shock.     Assessment:   - Hypotension likely 2/2 septic shock   - Pancreatic mass  - Intractable nausea/vomiting  - DALTON   - Hyperkalemia   - Demand ischemia  - Lactic acidosis   - Transaminitis likely shock liver   - HTN  - DM   -     Plan:  Neuro:    Cardiovascular:    Pulmonary:     Infectious Diseases:    Gastrointestinal:    Renal:    Heme/onc:   #Pancreatic mass   - patient was noted to have     Endo:   #DM  - patient with history of DM  - continue SSI   - primary team to confirm home meds with son     Skin/ catheter:   - no acute issues     Prophylaxis:   - HSQ for DVT prophylaxis     Goals of Care:   - Discussed GOC with sonJuan  - patient is FULL CODE     Dispo: Transfer to ICU Patient is a 66y old  Female who presents with a chief complaint of Intractable vomiting and abdominal pain. Transferred to ICU due to likely septic shock.     Assessment:   - Hypotension likely 2/2 septic shock   - Pancreatic mass  - Intractable nausea/vomiting  - DALTON   - Hyperkalemia   - Demand ischemia  - Lactic acidosis   - Transaminitis likely shock liver   - HTN  - DM     Plan:  Neuro:  - patient is AAOx2 currently   - no other acute issues     Cardiovascular:  #Hypotension likely 2/2 septic shock   - patient hypotensive even after receiving 3.5L boluses IVF   - will give another liter of NS and give albumin to see if improvement   - patient may likely need pressor support; central line consent obtained   - monitor BP      #Demand ischemia  - patient noted to have elevated troponins  - now have downtrended  - cardio, Dr. Lugo consulted; likely demand ischemia     Pulmonary:   - no acute issues     Infectious Diseases:  #Concern for septic shock   - patient presented due to nausea, vomiting and abdominal pain   - CT abdomen showing pancreatic mass and concern for mets   - will start zosyn   - send blood cx   - trend lactate; lactate of 11      Gastrointestinal:  #Vomting  - keep patient NPO for now   - advance diet as tolerated    #Transaminitis  - patient noted to have elevated LFTs that uptrended to 4000s  - likely shock liver   - monitor LFTs  - GI, Dr. Aguilera consulted    Renal:    Heme/onc:   #Pancreatic mass   - patient was noted to have     Endo:   #DM  - patient with history of DM  - continue SSI   - primary team to confirm home meds with son     Skin/ catheter:   - no acute issues     Prophylaxis:   - HSQ for DVT prophylaxis     Goals of Care:   - Discussed GOC with sonJuan  - patient is FULL CODE     Dispo: Transfer to ICU Patient is a 66y old  Female who presents with a chief complaint of Intractable vomiting and abdominal pain. Transferred to ICU due to likely septic shock.     Assessment:   - Hypotension likely 2/2 septic shock   - Pancreatic mass  - Intractable nausea/vomiting  - DALTON   - Hyperkalemia   - Demand ischemia  - Lactic acidosis   - Transaminitis likely shock liver   - HTN  - DM     Plan:  Neuro:  - patient is AAOx2 currently   - no other acute issues     Cardiovascular:  #Hypotension likely 2/2 septic shock   - patient hypotensive even after receiving 3.5L boluses IVF   - will give another liter of NS and give albumin to see if improvement   - patient may likely need pressor support; central line consent obtained   - monitor BP      #Demand ischemia  - patient noted to have elevated troponins  - now have downtrended  - f/u ECHO   - cardio, Dr. Lugo consulted; likely demand ischemia     Pulmonary:   - no acute issues     Infectious Diseases:  #Concern for septic shock   - patient presented due to nausea, vomiting and abdominal pain   - CT abdomen showing pancreatic mass and concern for mets   - will start zosyn   - send blood cx   - trend lactate; lactate of 11      Gastrointestinal:  #Vomting  - keep patient NPO for now   - advance diet as tolerated    #Transaminitis  - patient noted to have elevated LFTs that uptrended to 4000s  - likely shock liver   - monitor LFTs  - f/u hepatic US  - GI, Dr. Aguilera consulted    Renal:  #DALTON  - patient noted to have worsening renal failure  - monitor creatinine   - consider nephro consult if not improving     #Hyperkalemia  - potassium noted to be uptrending  - likely in setting of worsening renal failure  - received hyperkalemia cocktail including lokelma, D50 and insulin and ca gluconate  - monitor BMP closely     Heme/onc:   #Pancreatic mass   - patient was noted to have pancreatic mass on CT abdomen   - concern for metastasis  - heme/onc, Dr. Alfaro consulted     Endo:   #DM  - patient with history of DM  - continue SSI   - primary team to confirm home meds with son     Skin/ catheter:   - no acute issues     Prophylaxis:   - HSQ for DVT prophylaxis     Goals of Care:   - Discussed GOC with sonJuan  - patient is FULL CODE     Dispo: Transfer to ICU

## 2021-11-19 NOTE — PROGRESS NOTE ADULT - ATTENDING COMMENTS
Patient is a 66y old  Female who presents with a chief complaint of Intractable vomiting and abdominal pain. Transferred to ICU due to likely septic shock.     Assessment:   - Septic shock   - Anion gap metabolic acidosis 2/2 lactic acidosis 2/2 shock liver  - Pancreatic mass  - Intractable nausea/vomiting  - DALTON   - Hyperkalemia   - Demand ischemia  - Lactic acidosis   - Severe Acidosis   - Transaminitis likely shock liver   - HTN  - DM uncontrol       Plan   -o2 supp as needed   -Hemodynamic monitoring   -TLC placed by night team , will need A-line   -Continue to titrate vasopressor to maintain MAP>65  -Broad spec iv antibx   -Monitor LFT  -Autoanticoag due to shock liver   -GI eval noted   -ID eval   -Pancreatic mass probable due to malignancy   -Monitor LFT  -NAC  -Hepatology eval   -Bicarb gtt  -Neph eval   -D5NS   -Monitor FS q2h for hypoglycemia   - 2DECHO  - Cards eval  -D/C statin due to acute liver injury

## 2021-11-19 NOTE — CHART NOTE - NSCHARTNOTEFT_GEN_A_CORE
Spoke to patient's son regarding GOC. Son wants everything to be done to save her mom. He gave verbal consent for Central line.    Pt admitted for abdominal pain and vomiting munirley in the setting of pancreatic mass with mets to liver. Earlier on floor pt was hypotensive. s/p 3 lit bolus and 1 dose of midodrine 5mg PO. On admission pt was afebrile, normotensive, wbc normal. With repeat labs around 5 lactate came back 11.1, wbc 14.69, temp 97.9 (rectal) , pt continue to be hypotensive and RRT was called. Spoke to patient's son regarding GOC. Son wants everything to be done to save his mom. He gave verbal consent for Central line.    Pt admitted for abdominal pain and vomiting munirley in the setting of pancreatic mass with mets to liver. Earlier on floor pt was hypotensive. s/p 3 lit bolus and 1 dose of midodrine 5mg PO. On admission pt was afebrile, normotensive, wbc normal. With repeat labs around 5 lactate came back 11.1, wbc 14.69, temp 97.9 (rectal) , pt continue to be hypotensive and RRT was called.

## 2021-11-19 NOTE — CONSULT NOTE ADULT - SUBJECTIVE AND OBJECTIVE BOX
[facility]    Patient is a 66y old  Female who presents with a chief complaint of Intractable vomiting and abdominal pain (19 Nov 2021 09:30)    HPI:  67 yo F, ambulates independently , PMHx of HTN, DM came in with chief complain of intractable abdominal pain and vomiting. Hx obtained from patient and son at bedside. Pt went to her PMD office this morning for blood tests when she came back home she started having vomiting and abdominal pain. Abdominal pain is generalized, intermittent, 10/10 in intensity but came down 6/10 after pain meds in ED, stabbing in nature. Pt denied any SOB, diarrhea, headaches, visual changes, leg edema.     Of note pt's son mentioned to ED provider that pt is coming from [facility] and he was told that pt has liver mass and he  has been establishing PMD for his mom.     In ED: first ekg with afib and t wave inversion in V1 , trop x1 1974, repeat trop 2153 and repeat EKG with ST segment elevation in lead V1 only   CT A/P showed There is a mass in the region of the pancreatic head measuring 5.3 x 3.6 cm with distal pancreatic atrophy. Innumerable hypodense hepatic lesions concerning for metastatic disease. Further evaluation with pancreatic protocol CT or MRI is recommended.  The gallbladder is distended with wall thickening/edema. Correlate with right upper quadrant ultrasound if there is clinical concern for acute cholecystitis. Trace right pleural effusion with small focus of consolidation in the right lower lobe which may be infectious or neoplastic in etiology. Follow-up dedicated chest CT is recommended. (18 Nov 2021 21:53)    PMH/PSH:  PAST MEDICAL & SURGICAL HISTORY:  Diabetes    HTN (hypertension)    HLD (hyperlipidemia)    History of uterine fibroid      FH:  FAMILY HISTORY:      MEDS:  MEDICATIONS  (STANDING):  albumin human 25% IVPB 50 milliLiter(s) IV Intermittent every 6 hours  aspirin enteric coated 81 milliGRAM(s) Oral daily  atorvastatin 20 milliGRAM(s) Oral at bedtime  chlorhexidine 2% Cloths 1 Application(s) Topical <User Schedule>  heparin   Injectable 5000 Unit(s) SubCutaneous every 8 hours  insulin lispro (ADMELOG) corrective regimen sliding scale   SubCutaneous every 6 hours  piperacillin/tazobactam IVPB.. 3.375 Gram(s) IV Intermittent every 12 hours    MEDICATIONS  (PRN):    Allergies    No Known Allergies    Intolerances        Review of system    CONSTITUTIONAL:    Unable to assess due to patient mental condition. S/p versed           ______________________________________________________________________  PHYSICAL EXAM:  T(C): 36.5 (11-19-21 @ 08:00), Max: 37 (11-18-21 @ 12:54)  HR: 70 (11-19-21 @ 09:45)  BP: 92/51 (11-19-21 @ 09:45)  RR: 21 (11-19-21 @ 09:45)  SpO2: 96% (11-19-21 @ 09:45)  Wt(kg): --      GEN: NAD, normocephalic  CVS: S1S2+  CHEST: clear to auscultation  ABD: soft , RUQ tenderness, nondistended, bowel sounds present  EXTR: no cyanosis, no clubbing, no edema  NEURO: sleeping: s/p versed   SKIN:  warm;  non icteric    ______________________________________________________________________  LABS:                        10.2   14.19 )-----------( 108      ( 19 Nov 2021 09:39 )             33.0     11-19    143  |  110<H>  |  21<H>  ----------------------------<  187<H>  4.8   |  15<L>  |  x     Ca    8.3<L>      19 Nov 2021 09:39  Phos  7.0     11-19  Mg     1.6     11-19    TPro  x   /  Alb  1.8<L>  /  TBili  x   /  DBili  x   /  AST  x   /  ALT  x   /  AlkPhos  x   11-19    LIVER FUNCTIONS - ( 19 Nov 2021 09:39 )  Alb: 1.8 g/dL / Pro: x     / ALK PHOS: x     / ALT: x     / AST: x     / GGT: x             ____________________________________________    IMAGING:    < from: CT Chest No Cont (11.19.21 @ 03:05) >    EXAM:  CT CHEST                            PROCEDURE DATE:  11/19/2021          INTERPRETATION:  PROCEDURE INFORMATION:  Exam: CT Chest Without Contrast  Exam date and time: 11/19/2021 3:03 AM  Age: 66 years old  Clinical indication: Pleural effusion    TECHNIQUE:  Imaging protocol: Diagnostic computed tomography of the chest without contrast.  3D rendering (Not supervised by radiologist): MIP and/or 3D reconstructed  images were created by the technologist.    COMPARISON:  CT ABDOMEN AND PELVIS 11/18/2021 6:44 PM    FINDINGS:  Lungs/airway: Small bilateral atelectasis. Nonspecific patchy ground-glass opacities within the left mid and lower lung. A few small lung nodules bilaterally with the largest measuring up to 5 mm in the right lower lobe (image 93 series 3); metastasis cannot be excluded. The central airway is patent.  Pleural spaces: Small pleural effusions layering dependently, slightly larger on the right. No evidence for pneumothorax.  Heart: Cardiomegaly. Mild pericardial effusion with thickness of 10 mm.  Vessels: No aortic aneurysm. Aortic and coronary artery calcifications are present. Mild prominence of the main pulmonary artery may represent pulmonary arterial hypertension.  Lymph nodes: Unremarkable. No enlarged lymph nodes.    Liver: Numerous hypodense lesions within the hepatic parenchyma, suggestive of metastasis.  Gallbladder: Nonspecific gallbladder wall thickening.  Pancreas: Large pancreatic head mass. Please see abdominal/pelvic CT yesterday.  Spleen: Normal size spleen.  Intraperitoneal space: Mild ascites.  Bones/joints: Degenerative spondylosis  Soft tissues/neck base: Mild left goiter. 1.5 cm hypodense lesion in the left thyroid; thyroid ultrasound may be pursued for further evaluation.    IMPRESSION:  1. Small bilateral pleural effusions.  2. Mild pericardial effusion.  3. Mild ascites.  4. Pancreatic head mass, suspicious for pancreatic cancer.  5. Multiple hypodense lesions within the liver, suggestive of metastasis. Abdominal MR without and with IV contrast may be pursued for further evaluation.  6. A few small lung nodules bilaterally; metastasis cannot be excluded.  7. Nonspecific patchy ground-glass opacities within the left lung may represent pneumonia. Clinical correlation is recommended.  8. Mild left goiter. 1.5 cm hypodense lesion in the left thyroid; thyroid ultrasound may be pursued for further evaluation.  9. Nonspecific gallbladder wall thickening if clinically indicated, gallbladder ultrasound may be pursued for further evaluation.  10. Mild prominence of the main pulmonary artery may represent pulmonary arterial hypertension.    A preliminary report was provided by [employer].    --- End of Report ---    < end of copied text >  < from: CT Abdomen and Pelvis No Cont (11.18.21 @ 18:53) >    EXAM:  CT ABDOMEN AND PELVIS                            PROCEDURE DATE:  11/18/2021          INTERPRETATION:  CLINICAL INFORMATION: Diffuse abdominal pain and vomiting, weakness    COMPARISON: None.    CONTRAST/COMPLICATIONS:  IV Contrast: NONE  Oral Contrast: NONE  Complications: None reported at time of study completion    PROCEDURE:  CT of the Abdomen and Pelvis was performed.  Sagittal and coronal reformats were performed.    FINDINGS:  LOWER CHEST: Trace right pleural effusion as well as bibasilar atelectasis. Small focus of consolidation at the right lung base with groundglass opacities.    LIVER: Multiple indeterminate hypodense lesions throughout the liver. For reference a lesion in the posterior right hepatic lobe measures 8.2 x 7.2 cm.  BILE DUCTS: Normal caliber.  GALLBLADDER: Distended with gallbladder thickening/edema.  SPLEEN: Within normal limits.  PANCREAS: Pancreatic head mass measures by 0.3 x 3.6 cm with distal pancreatic atrophy.  ADRENALS: Within normal limits.  KIDNEYS/URETERS: Within normal limits.    BLADDER: Minimally distended.  REPRODUCTIVE ORGANS: Hysterectomy.    BOWEL: Tiny hiatal hernia No bowel obstruction. Appendix is normal.  PERITONEUM: Trace abdominal and pelvic ascites.  VESSELS: Atherosclerotic changes.  RETROPERITONEUM/LYMPH NODES: Enlarged upper abdominal/periportal nodes, suboptimally visualized without contrast.  ABDOMINAL WALL: Small fat-containing periumbilical hernia.  BONES: Degenerative changes.    IMPRESSION:  There is a mass in the region of the pancreatic head measuring 5.3 x 3.6 cm with distal pancreatic atrophy. Innumerable hypodense hepatic lesions concerning for metastatic disease. Further evaluation with pancreatic protocol CT or MRI is recommended.    The gallbladder isdistended with wall thickening/edema. Correlate with right upper quadrant ultrasound if there is clinical concern for acute cholecystitis.    Trace right pleural effusion with small focus of consolidation in the right lower lobe which may be infectious or neoplastic in etiology. Follow-up dedicated chest CT is recommended.    Additional findings as above.    --- End of Report ---    < end of copied text >

## 2021-11-19 NOTE — CONSULT NOTE ADULT - PROBLEM SELECTOR RECOMMENDATION 2
Noted to have elevated LFT likely due to metastatic disease Vs cholecystitis  -LFT trending up  -plan as per primary team  -Patient will need EUS with FNB in the future. Currently patient is not stable.   -Trend LFT   -consider hepatology consult. Noted to have elevated LFT likely due to shock liver  -LFT trending up  -consider changing Zosyn since it may cause liver toxicity   -plan as per primary team  -Patient will need EUS with FNB in the future. Currently patient is not stable.   -Trend LFT   -consider hepatology consult.

## 2021-11-19 NOTE — CONSULT NOTE ADULT - ASSESSMENT
65 yo F, ambulates independently , PMHx of HTN, DM came in with chief complain of intractable abdominal pain and vomiting. Hx obtained from patient and son at bedside. Pt went to her PMD office this morning for blood tests when she came back home she started having vomiting and abdominal pain. Nephrology consult called for DALTON/Hyperkalemia    Assessment:  1) Non oliguric DALTON with unknown prior baseline renal function likely pre-renal vs ATN from hypovolemic/septic shock  2) Hyperkalemia   3) Lactic/metabolic acidosis  4) Advanced liver mets with possible pancreatic malignancy  5) Hyperphosphatemia  6) Hypoalbuminemia    Recommend:  Strict I/o  Avoid nephrotoxic agents  Start IV bicarbonte drip as per ordered  Urinalysis, urine lytes  Maintain MAP>65-70 mm Hg  Monitor BMP and electrolytes every 6 hrly  GI/hepatology team  ICU team following  Treatment of hyperkalemia received with IV bicarbonate calcium, Lokelma,   Poor candidate for RRT/HD  CT abdomen and pelvis with pancreatic head mass with possible liver mets  Advanced directives and GOC discussion with family  Will follow

## 2021-11-19 NOTE — CONSULT NOTE ADULT - CONSULT REQUESTED DATE/TIME
19-Nov-2021 06:34
19-Nov-2021
19-Nov-2021 18:18
19-Nov-2021 09:31
19-Nov-2021 12:10
19-Nov-2021 10:28

## 2021-11-19 NOTE — PROCEDURAL SAFETY CHECKLIST WITH OR WITHOUT SEDATION - NSPRESITESIDESED_GEN_ALL_CORE
Per mom pt has had cough and congestion x 1 week. Last night pt had temp of 102 and was sent home from  today for a fever.  Tylenol given at 1pm. done...

## 2021-11-19 NOTE — CONSULT NOTE ADULT - SUBJECTIVE AND OBJECTIVE BOX
C A R D I O L O G Y  *********************    DATE OF SERVICE: 11-19-21    HISTORY OF PRESENT ILLNESS: HPI:  67 yo F, ambulates independently , PMHx of HTN, DM came in with chief complain of intractable abdominal pain and vomiting. Hx obtained from patient and son at bedside. Pt went to her PMD office this morning for blood tests when she came back home she started having vomiting and abdominal pain. Abdominal pain is generalized, intermittent, 10/10 in intensity but came down 6/10 after pain meds in ED, stabbing in nature. Pt denied any SOB, diarrhea, headaches, visual changes, leg edema.     Of note pt's son mentioned to ED provider that pt is coming from [facility] and he was told that pt has liver mass and he  has been establishing PMD for his mom.     In ED: first ekg with afib and t wave inversion in V1 , trop x1 1974, repeat trop 2153 and repeat EKG with ST segment elevation in lead V1 only   CT A/P showed There is a mass in the region of the pancreatic head measuring 5.3 x 3.6 cm with distal pancreatic atrophy. Innumerable hypodense hepatic lesions concerning for metastatic disease. Further evaluation with pancreatic protocol CT or MRI is recommended.  The gallbladder is distended with wall thickening/edema. Correlate with right upper quadrant ultrasound if there is clinical concern for acute cholecystitis. Trace right pleural effusion with small focus of consolidation in the right lower lobe which may be infectious or neoplastic in etiology. Follow-up dedicated chest CT is recommended. (18 Nov 2021 21:53)      PAST MEDICAL & SURGICAL HISTORY:  Diabetes    HTN (hypertension)    HLD (hyperlipidemia)    History of uterine fibroid            MEDICATIONS:  MEDICATIONS  (STANDING):  acetylcysteine IVPB 3.4 Gram(s) IV Intermittent once  acetylcysteine IVPB 7 Gram(s) IV Intermittent once  albumin human 25% IVPB 50 milliLiter(s) IV Intermittent every 6 hours  aspirin enteric coated 81 milliGRAM(s) Oral daily  chlorhexidine 2% Cloths 1 Application(s) Topical <User Schedule>  chlorhexidine 4% Liquid 1 Application(s) Topical <User Schedule>  dextrose 5% + sodium chloride 0.9%. 1000 milliLiter(s) (75 mL/Hr) IV Continuous <Continuous>  heparin   Injectable 5000 Unit(s) SubCutaneous every 8 hours  HYDROmorphone  Injectable 1 milliGRAM(s) IV Push every 4 hours  insulin lispro (ADMELOG) corrective regimen sliding scale   SubCutaneous every 6 hours  piperacillin/tazobactam IVPB.. 3.375 Gram(s) IV Intermittent every 12 hours  sodium bicarbonate  Infusion 0.221 mEq/kG/Hr (100 mL/Hr) IV Continuous <Continuous>      Allergies    No Known Allergies    Intolerances        FAMILY HISTORY:    Non-contributary for premature coronary disease or sudden cardiac death    SOCIAL HISTORY:    [X ] Non-smoker  [ ] Smoker  [ ] Alcohol    FLU VACCINE THIS YEAR STARTS IN AUGUST:  [ ] Yes    [ ] No    IF OVER 65 HAVE YOU EVER HAD A PNA VACCINE:  [ ] Yes    [ ] No       [ ] N/A      REVIEW OF SYSTEMS:  [ ]chest pain  [  ]shortness of breath  [  ]palpitations  [  ]syncope  [ ]near syncope [ ]upper extremity weakness   [ ] lower extremity weakness  [  ]diplopia  [  ]altered mental status   [  ]fevers  [ ]chills [ ]nausea  [ ]vomitting  [  ]dysphagia    [ ]abdominal pain  [ ]melena  [ ]BRBPR    [  ]epistaxis  [  ]rash    [ ]lower extremity edema        [X] All others negative	  [ ] Unable to obtain      LABS:	 	    CARDIAC MARKERS:        Troponin I, High Sensitivity Result: 2033.1 ng/L (11-18-21 @ 22:14)  Troponin I, High Sensitivity Result: 2153.1 ng/L (11-18-21 @ 20:08)  Troponin I, High Sensitivity Result: 1974.3 ng/L (11-18-21 @ 15:45)                            10.2   14.19 )-----------( 108      ( 19 Nov 2021 09:39 )             33.0     Hb Trend: 10.2<--, 12.7<--, 12.8<--    11-19    143  |  110<H>  |  21<H>  ----------------------------<  187<H>  4.8   |  15<L>  |  2.54<H>    Ca    8.3<L>      19 Nov 2021 09:39  Phos  7.0     11-19  Mg     1.6     11-19    TPro  4.9<L>  /  Alb  1.8<L>  /  TBili  0.9  /  DBili  x   /  AST  6158<H>  /  ALT  2968<H>  /  AlkPhos  68  11-19    Creatinine Trend: 2.54<--, 2.81<--, 2.15<--, 1.64<--    TSH: Thyroid Stimulating Hormone, Serum: 0.90 uU/mL (11-19 @ 05:32)      PHYSICAL EXAM:  T(C): 36.5 (11-19-21 @ 08:00), Max: 37 (11-18-21 @ 12:54)  HR: 69 (11-19-21 @ 10:30) (67 - 87)  BP: 81/50 (11-19-21 @ 10:30) (62/33 - 123/79)  RR: 20 (11-19-21 @ 10:30) (16 - 25)  SpO2: 94% (11-19-21 @ 10:30) (90% - 100%)  Wt(kg): --   BMI (kg/m2): 27.4 (11-18-21 @ 12:54)  I&O's Summary    HEENT:  (-)icterus (-)pallor  CV: N S1 S2 1/6 SATNAM (+)2 Pulses B/l  Resp:  Clear to ausculatation B/L, normal effort  GI: (+) BS Soft, NT, ND  Lymph:  (-)Edema, (-)obvious lymphadenopathy  Skin: Warm to touch, Normal turgor  Psych: Appropriate mood and affect        TELEMETRY: 	Sinus       ECG:  	Sinus 73 BPM, IRBBB, T wave abnormality     RADIOLOGY:         CXR:   PENDING    ASSESSMENT/PLAN: 	66y Female PMHx of HTN, DM came in with chief complain of intractable abdominal pain and vomiting hypotensive, pancreativc MAss, NSTEMI    - Suspect type II MI in the setting of hypotension and shock  - Check echo  - PAtient is CP free  - Correct metabolic derangement   - Broad spectrum abx per ICU    I once again thank you for allowing me to participate in the care of your patient.  If you have any questions or concerns please do not hesitate to contact me.      Daron Aguila MD, [facility]  BEEPER [phone]       C A R D I O L O G Y  *********************    DATE OF SERVICE: 21    HISTORY OF PRESENT ILLNESS: HPI:  65 yo F, ambulates independently , PMHx of HTN, DM came in with chief complain of intractable abdominal pain and vomiting. Hx obtained from patient and son at bedside. Pt went to her PMD office this morning for blood tests when she came back home she started having vomiting and abdominal pain. Abdominal pain is generalized, intermittent, 10/10 in intensity but came down 6/10 after pain meds in ED, stabbing in nature. Pt denied any SOB, diarrhea, headaches, visual changes, leg edema.     Of note pt's son mentioned to ED provider that pt is coming from [facility] and he was told that pt has liver mass and he  has been establishing PMD for his mom.     In ED: first ekg with afib and t wave inversion in V1 , trop x1 1974, repeat trop 2153 and repeat EKG with ST segment elevation in lead V1 only   CT A/P showed There is a mass in the region of the pancreatic head measuring 5.3 x 3.6 cm with distal pancreatic atrophy. Innumerable hypodense hepatic lesions concerning for metastatic disease. Further evaluation with pancreatic protocol CT or MRI is recommended.  The gallbladder is distended with wall thickening/edema. Correlate with right upper quadrant ultrasound if there is clinical concern for acute cholecystitis. Trace right pleural effusion with small focus of consolidation in the right lower lobe which may be infectious or neoplastic in etiology. Follow-up dedicated chest CT is recommended. (2021 21:53)      PAST MEDICAL & SURGICAL HISTORY:  Diabetes    HTN (hypertension)    HLD (hyperlipidemia)    History of uterine fibroid            MEDICATIONS:  MEDICATIONS  (STANDING):  acetylcysteine IVPB 3.4 Gram(s) IV Intermittent once  acetylcysteine IVPB 7 Gram(s) IV Intermittent once  albumin human 25% IVPB 50 milliLiter(s) IV Intermittent every 6 hours  aspirin enteric coated 81 milliGRAM(s) Oral daily  chlorhexidine 2% Cloths 1 Application(s) Topical <User Schedule>  chlorhexidine 4% Liquid 1 Application(s) Topical <User Schedule>  dextrose 5% + sodium chloride 0.9%. 1000 milliLiter(s) (75 mL/Hr) IV Continuous <Continuous>  heparin   Injectable 5000 Unit(s) SubCutaneous every 8 hours  HYDROmorphone  Injectable 1 milliGRAM(s) IV Push every 4 hours  insulin lispro (ADMELOG) corrective regimen sliding scale   SubCutaneous every 6 hours  piperacillin/tazobactam IVPB.. 3.375 Gram(s) IV Intermittent every 12 hours  sodium bicarbonate  Infusion 0.221 mEq/kG/Hr (100 mL/Hr) IV Continuous <Continuous>      Allergies    No Known Allergies    Intolerances        FAMILY HISTORY:    Non-contributary for premature coronary disease or sudden cardiac death    SOCIAL HISTORY:    [X ] Non-smoker  [ ] Smoker  [ ] Alcohol    FLU VACCINE THIS YEAR STARTS IN AUGUST:  [ ] Yes    [ ] No    IF OVER 65 HAVE YOU EVER HAD A PNA VACCINE:  [ ] Yes    [ ] No       [ ] N/A      REVIEW OF SYSTEMS:  [ ]chest pain  [  ]shortness of breath  [  ]palpitations  [  ]syncope  [ ]near syncope [ ]upper extremity weakness   [ ] lower extremity weakness  [  ]diplopia  [  ]altered mental status   [  ]fevers  [ ]chills [ ]nausea  [ ]vomitting  [  ]dysphagia    [ ]abdominal pain  [ ]melena  [ ]BRBPR    [  ]epistaxis  [  ]rash    [ ]lower extremity edema        [X] All others negative	  [ ] Unable to obtain      LABS:	 	    CARDIAC MARKERS:        Troponin I, High Sensitivity Result: 2033.1 ng/L (21 @ 22:14)  Troponin I, High Sensitivity Result: 2153.1 ng/L (21 @ 20:08)  Troponin I, High Sensitivity Result: 1974.3 ng/L (21 @ 15:45)                            10.2   14.19 )-----------( 108      ( 2021 09:39 )             33.0     Hb Trend: 10.2<--, 12.7<--, 12.8<--        143  |  110<H>  |  21<H>  ----------------------------<  187<H>  4.8   |  15<L>  |  2.54<H>    Ca    8.3<L>      2021 09:39  Phos  7.0       Mg     1.6         TPro  4.9<L>  /  Alb  1.8<L>  /  TBili  0.9  /  DBili  x   /  AST  6158<H>  /  ALT  2968<H>  /  AlkPhos  68      Creatinine Trend: 2.54<--, 2.81<--, 2.15<--, 1.64<--    TSH: Thyroid Stimulating Hormone, Serum: 0.90 uU/mL ( @ 05:32)      PHYSICAL EXAM:  T(C): 36.5 (21 @ 08:00), Max: 37 (21 @ 12:54)  HR: 69 (21 @ 10:30) (67 - 87)  BP: 81/50 (21 @ 10:30) (62/33 - 123/79)  RR: 20 (21 @ 10:30) (16 - 25)  SpO2: 94% (21 @ 10:30) (90% - 100%)  Wt(kg): --   BMI (kg/m2): 27.4 (21 @ 12:54)  I&O's Summary    HEENT:  (-)icterus (-)pallor  CV: N S1 S2 1/6 SATNAM (+)2 Pulses B/l  Resp:  Clear to ausculatation B/L, normal effort  GI: (+) BS Soft, NT, ND  Lymph:  (-)Edema, (-)obvious lymphadenopathy  Skin: Warm to touch, Normal turgor  Psych: Appropriate mood and affect        TELEMETRY: 	Sinus       EC. Afib 137 BPM  	            2  Sinus 73 BPM, IRBBB, T wave abnormality     RADIOLOGY:         CXR:   PENDING    ASSESSMENT/PLAN: 	66y Female PMHx of HTN, DM came in with chief complain of intractable abdominal pain and vomiting hypotensive, pancreativc MAss, NSTEMI    - Suspect type II MI in the setting of hypotension and shock  - Check echo  - PAtient is CP free  - Correct metabolic derangement   - Broad spectrum abx per ICU  - New afib would start heparin gtt if not otherwise contraindicated     I once again thank you for allowing me to participate in the care of your patient.  If you have any questions or concerns please do not hesitate to contact me.      Daron Aguila MD, [facility]  BEEPER [phone]

## 2021-11-20 LAB
CREAT ?TM UR-MCNC: 165 MG/DL — SIGNIFICANT CHANGE UP
PROT ?TM UR-MCNC: 228 MG/DL — HIGH (ref 0–12)
PROT/CREAT UR-RTO: 1.4 RATIO — HIGH (ref 0–0.2)
UUN UR-MCNC: 116 MG/DL — SIGNIFICANT CHANGE UP

## 2021-11-24 LAB
CULTURE RESULTS: SIGNIFICANT CHANGE UP
CULTURE RESULTS: SIGNIFICANT CHANGE UP
SPECIMEN SOURCE: SIGNIFICANT CHANGE UP
SPECIMEN SOURCE: SIGNIFICANT CHANGE UP

## 2021-12-29 PROCEDURE — 96374 THER/PROPH/DIAG INJ IV PUSH: CPT

## 2021-12-29 PROCEDURE — 83735 ASSAY OF MAGNESIUM: CPT

## 2021-12-29 PROCEDURE — 87040 BLOOD CULTURE FOR BACTERIA: CPT

## 2021-12-29 PROCEDURE — G1004: CPT

## 2021-12-29 PROCEDURE — 85027 COMPLETE CBC AUTOMATED: CPT

## 2021-12-29 PROCEDURE — P9047: CPT

## 2021-12-29 PROCEDURE — 93005 ELECTROCARDIOGRAM TRACING: CPT

## 2021-12-29 PROCEDURE — 87635 SARS-COV-2 COVID-19 AMP PRB: CPT

## 2021-12-29 PROCEDURE — 82105 ALPHA-FETOPROTEIN SERUM: CPT

## 2021-12-29 PROCEDURE — 71045 X-RAY EXAM CHEST 1 VIEW: CPT

## 2021-12-29 PROCEDURE — 80061 LIPID PANEL: CPT

## 2021-12-29 PROCEDURE — 83690 ASSAY OF LIPASE: CPT

## 2021-12-29 PROCEDURE — 96375 TX/PRO/DX INJ NEW DRUG ADDON: CPT

## 2021-12-29 PROCEDURE — 85610 PROTHROMBIN TIME: CPT

## 2021-12-29 PROCEDURE — 84156 ASSAY OF PROTEIN URINE: CPT

## 2021-12-29 PROCEDURE — 80053 COMPREHEN METABOLIC PANEL: CPT

## 2021-12-29 PROCEDURE — 84443 ASSAY THYROID STIM HORMONE: CPT

## 2021-12-29 PROCEDURE — 85379 FIBRIN DEGRADATION QUANT: CPT

## 2021-12-29 PROCEDURE — 84484 ASSAY OF TROPONIN QUANT: CPT

## 2021-12-29 PROCEDURE — 86304 IMMUNOASSAY TUMOR CA 125: CPT

## 2021-12-29 PROCEDURE — 36415 COLL VENOUS BLD VENIPUNCTURE: CPT

## 2021-12-29 PROCEDURE — 71250 CT THORAX DX C-: CPT

## 2021-12-29 PROCEDURE — 86803 HEPATITIS C AB TEST: CPT

## 2021-12-29 PROCEDURE — 82803 BLOOD GASES ANY COMBINATION: CPT

## 2021-12-29 PROCEDURE — 80076 HEPATIC FUNCTION PANEL: CPT

## 2021-12-29 PROCEDURE — 84540 ASSAY OF URINE/UREA-N: CPT

## 2021-12-29 PROCEDURE — 84100 ASSAY OF PHOSPHORUS: CPT

## 2021-12-29 PROCEDURE — 83036 HEMOGLOBIN GLYCOSYLATED A1C: CPT

## 2021-12-29 PROCEDURE — 80048 BASIC METABOLIC PNL TOTAL CA: CPT

## 2021-12-29 PROCEDURE — 86769 SARS-COV-2 COVID-19 ANTIBODY: CPT

## 2021-12-29 PROCEDURE — 85730 THROMBOPLASTIN TIME PARTIAL: CPT

## 2021-12-29 PROCEDURE — 99285 EMERGENCY DEPT VISIT HI MDM: CPT

## 2021-12-29 PROCEDURE — 83935 ASSAY OF URINE OSMOLALITY: CPT

## 2021-12-29 PROCEDURE — 76705 ECHO EXAM OF ABDOMEN: CPT

## 2021-12-29 PROCEDURE — 86901 BLOOD TYPING SEROLOGIC RH(D): CPT

## 2021-12-29 PROCEDURE — 74176 CT ABD & PELVIS W/O CONTRAST: CPT | Mod: MG

## 2021-12-29 PROCEDURE — 81001 URINALYSIS AUTO W/SCOPE: CPT

## 2021-12-29 PROCEDURE — 84300 ASSAY OF URINE SODIUM: CPT

## 2021-12-29 PROCEDURE — 93306 TTE W/DOPPLER COMPLETE: CPT

## 2021-12-29 PROCEDURE — 86850 RBC ANTIBODY SCREEN: CPT

## 2021-12-29 PROCEDURE — 85025 COMPLETE CBC W/AUTO DIFF WBC: CPT

## 2021-12-29 PROCEDURE — 83605 ASSAY OF LACTIC ACID: CPT

## 2021-12-29 PROCEDURE — 82570 ASSAY OF URINE CREATININE: CPT

## 2021-12-29 PROCEDURE — 86301 IMMUNOASSAY TUMOR CA 19-9: CPT

## 2021-12-29 PROCEDURE — 82962 GLUCOSE BLOOD TEST: CPT

## 2021-12-29 PROCEDURE — 86900 BLOOD TYPING SEROLOGIC ABO: CPT
